# Patient Record
Sex: FEMALE | URBAN - METROPOLITAN AREA
[De-identification: names, ages, dates, MRNs, and addresses within clinical notes are randomized per-mention and may not be internally consistent; named-entity substitution may affect disease eponyms.]

---

## 2018-04-03 ENCOUNTER — EMERGENCY (EMERGENCY)
Facility: HOSPITAL | Age: 28
LOS: 0 days | Discharge: HOME | End: 2018-04-03

## 2018-04-03 VITALS
HEART RATE: 93 BPM | DIASTOLIC BLOOD PRESSURE: 82 MMHG | SYSTOLIC BLOOD PRESSURE: 130 MMHG | TEMPERATURE: 98 F | OXYGEN SATURATION: 97 % | RESPIRATION RATE: 20 BRPM

## 2018-04-03 DIAGNOSIS — S61.032A PUNCTURE WOUND WITHOUT FOREIGN BODY OF LEFT THUMB WITHOUT DAMAGE TO NAIL, INITIAL ENCOUNTER: ICD-10-CM
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
## 2018-04-03 NOTE — ED PROVIDER NOTE - PHYSICAL EXAMINATION
Gen: Alert, NAD, well appearing  Skin: +puncture wound to posterior aspect of left thumb bleeding controlled sensation intact

## 2018-04-03 NOTE — ED PROVIDER NOTE - OBJECTIVE STATEMENT
Patient presents to the ED for evaluation after a needle stick. 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  During a trauma alert patient was stuck with clean IV needle as another nurse was attempting to place IV. patient washed hands. puncture wound noted to left posterior aspect of thumb.  patient up to date on tetanus and vaccines.

## 2018-04-03 NOTE — ED PROVIDER NOTE - PROGRESS NOTE DETAILS
rapid HIV test on source patient advised to trauma staff. patient to follow up with employee health discussed.

## 2018-04-03 NOTE — ED PROVIDER NOTE - MEDICAL DECISION MAKING DETAILS
patient d/c to follow up with [employer], clean IV needle risk zero for ID however patient advised to follow up patient has had recent clearance done by [facility] team

## 2018-08-31 ENCOUNTER — OUTPATIENT (OUTPATIENT)
Dept: OUTPATIENT SERVICES | Facility: HOSPITAL | Age: 28
LOS: 1 days | Discharge: HOME | End: 2018-08-31

## 2018-08-31 DIAGNOSIS — E28.2 POLYCYSTIC OVARIAN SYNDROME: ICD-10-CM

## 2018-08-31 DIAGNOSIS — N92.6 IRREGULAR MENSTRUATION, UNSPECIFIED: ICD-10-CM

## 2018-12-30 ENCOUNTER — EMERGENCY (EMERGENCY)
Facility: HOSPITAL | Age: 28
LOS: 0 days | Discharge: HOME | End: 2018-12-30
Admitting: PHYSICIAN ASSISTANT

## 2018-12-30 VITALS
OXYGEN SATURATION: 98 % | HEART RATE: 78 BPM | SYSTOLIC BLOOD PRESSURE: 118 MMHG | RESPIRATION RATE: 16 BRPM | DIASTOLIC BLOOD PRESSURE: 73 MMHG | TEMPERATURE: 98 F

## 2018-12-30 DIAGNOSIS — Z77.21 CONTACT WITH AND (SUSPECTED) EXPOSURE TO POTENTIALLY HAZARDOUS BODY FLUIDS: ICD-10-CM

## 2018-12-30 RX ORDER — EMTRICITABINE AND TENOFOVIR DISOPROXIL FUMARATE 200; 300 MG/1; MG/1
1 TABLET, FILM COATED ORAL ONCE
Qty: 0 | Refills: 0 | Status: COMPLETED | OUTPATIENT
Start: 2018-12-30 | End: 2018-12-30

## 2018-12-30 RX ORDER — RALTEGRAVIR 400 MG/1
400 TABLET, FILM COATED ORAL ONCE
Qty: 0 | Refills: 0 | Status: COMPLETED | OUTPATIENT
Start: 2018-12-30 | End: 2018-12-30

## 2018-12-30 RX ADMIN — RALTEGRAVIR 400 MILLIGRAM(S): 400 TABLET, FILM COATED ORAL at 01:56

## 2018-12-30 RX ADMIN — EMTRICITABINE AND TENOFOVIR DISOPROXIL FUMARATE 1 TABLET(S): 200; 300 TABLET, FILM COATED ORAL at 01:56

## 2018-12-30 NOTE — ED ADULT NURSE NOTE - OBJECTIVE STATEMENT
Pt presents for evaluation of exposure to blood. Pt was exposed to blood while removing an 18G IV from patient arms and felt liquid go into her right eye. Pt is unsure if it was blood. Pt is up to date on all vaccines.

## 2018-12-30 NOTE — ED PROVIDER NOTE - PROGRESS NOTE DETAILS
patient to follow up with employee health minimal risk for exposure but given source unknown patient to be started on PEP. Call to source patient made and waiting call back.

## 2018-12-30 NOTE — ED PROVIDER NOTE - PHYSICAL EXAMINATION
Gen: Alert, NAD, well appearing  Head: NC, AT, PERRL, EOMI, normal lids/conjunctiva, Contact lenses removed  ENT: normal hearing, patent oropharynx without erythema/exudate, uvula midline  Skin: no rash  Neuro: AAOx3

## 2018-12-30 NOTE — ED PROVIDER NOTE - NS ED ROS FT
Review of Systems    Constitutional: (-) fever  Eyes/ENT: (-) blurry vision  Respiratory: (-) cough, (-) shortness of breath  Gastrointestinal: (-) vomiting  Musculoskeletal: (-) neck pain  Neurological: (-) headache

## 2018-12-30 NOTE — ED PROVIDER NOTE - OBJECTIVE STATEMENT
Patient is a 28 years old F presents to the ED for evaluation after exposure to blood.  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 Pt is unsure if she was exposed to blood.  Source patient left prior to being consented for HIV source testing. Pt is up to date on vaccination.

## 2018-12-30 NOTE — ED PROVIDER NOTE - NS ED NOTE AC HIGH RISK COUNTRIES
Mich Campbell MD  ENT, [facility]   [address] [facility] [facility] [address]   Phone: [phone]    OR    Ricky White MD  [facility]   [address] [facility] [address]   Phone: [phone]    =======================    Medicare Wellness Visit  Plan for Preventive Care    A good way for you to stay healthy is to use preventive care.  Medicare covers many services that can help you stay healthy.* The goal of these services is to find any health problems as quickly as possible. Finding problems early can help make them easier to treat.  Your personal plan below lists the services you may need and when they are due.     Health Maintenance Summary     Topic Due On Due Status Completed On Postpone Until Reason    MAMMOGRAM - BREAST CANCER SCREENING Aubrey 10, 2019 Not Due Aubrey 10, 2017      Colorectal Cancer Screening - Colonoscopy Apr 25, 2024 Not Due Apr 25, 2014      Osteoporosis Screening  Completed Aubrey 10, 2017      Immunization-Zoster Jun 13, 2008 Postponed  Jan 27, 2018 Insurance or Financial    Immunization - Pneumococcal  Completed Oct 3, 2014      Diabetes Eye Exam Oct 25, 2017 Due Soon Oct 25, 2016      Glycohemoglobin A1C  (Diabetes Sugar)  Nov 25, 2017 Not Due Aug 25, 2017      GFR  (Kidney Function Test)  Aug 25, 2018 Not Due Aug 25, 2017      Immunization - TDAP Pregnancy  Hidden       Diabetes Foot Exam  Oct 14, 2017 Due Soon Oct 14, 2016      Medicare Wellness Visit Sep 8, 2018 Not Due Sep 8, 2017      IMMUNIZATION - DTaP/Tdap/Td Jun 13, 1967 Postponed  Sep 11, 2017 Insurance or Financial    Immunization-Influenza Sep 1, 2017 Due On Sep 19, 2016      Hepatitis C Screening Jun 13, 1999 Overdue              Preventive Care for Women and Men    Heart Screenings (Cardiovascular):  · Blood tests are used to check your cholesterol, lipid and triglyceride levels. High levels can increase your risk for heart disease and stroke. High levels can be treated with  medications, diet and exercise. Lowering your levels can help keep your heart and blood vessels healthy.  Your provider will order these tests if they are needed.    · An ultrasound is done to see if you have an abdominal aortic aneurysm (AAA).  This is an enlargement of one of the main blood vessels that delivers blood to the body.   In the [place], 9,000 deaths are caused by AAA.  You may not even know you have this problem and as many as 1 in 3 people will have a serious problem if it is not treated.  Early diagnosis allows for more effective treatment and cure.  If you have a family history of AAA or are a male age 65-75 who has smoked, you are at higher risk of an AAA.  Your provider can order this test, if needed.    Colorectal Screening:  · There are many tests that are used to check for cancer of your colon and rectum. You and your provider should discuss what test is best for you and when to have it done.  Options include:  · Screening Colonoscopy: exam of the entire colon, seen through a flexible lighted tube.  · Flexible Sigmoidoscopy: exam of the last third (sigmoid portion) of the colon and rectum, seen through a flexible lighted tube.  · Cologuard DNA stool test: a sample of your stool is used to screen for cancer and unseen blood in your stool.  · Fecal Occult Blood Test: a sample of your stool is studied to find any unseen blood    Flu Shot:  · An immunization that helps to prevent influenza (the flu). You should get this every year. The best time to get the shot is in the fall.    Pneumococcal Shot:  • Vaccines are available that can help prevent pneumococcal disease, which is any type of infection caused by Streptococcus pneumoniae bacteria.   Their use can prevent some cases of pneumonia, meningitis, and sepsis. There are two types of pneumococcal vaccines:   o Conjugate vaccines (PCV-13 or Prevnar 13®) - helps protect against the 13 types of pneumococcal bacteria that are the most common  causes of serious infections in children and adults.    o Polysaccharide vaccine (PPSV23 or Poktsnvwl83®) - helps protect against 23 types of pneumococcal bacteria for patients who are recommended to get it.  These vaccines should be given at least 12 months apart.  A booster is usually not needed.     Hepatitis B Shot:  · An immunization that helps to protect people from getting Hepatitis B. Hepatitis B is a virus that spreads through contact with infected blood or body fluids. Many people with the virus do not have symptoms.  The virus can lead to serious problems, such as liver disease. Some people are at higher risk than others. Your doctor will tell you if you need this shot.     Diabetes Screening:  · A test to measure sugar (glucose) in your blood is called a fasting blood sugar. Fasting means you cannot have food or drink for at least 8 hours before the test. This test can detect diabetes long before you may notice symptoms.    Glaucoma Screening:  · Glaucoma screening is performed by your eye doctor. The test measures the fluid pressure inside your eyes to determine if you have glaucoma.     Hepatitis C Screening:  · A blood test to see if you have the hepatitis C virus.  Hepatitis C attacks the liver and is a major cause of chronic liver disease.  Medicare will cover a single screening for all adults born between 1945 & 1965, or high risk patients (people who have injected illegal drugs or people who have had blood transfusions).  High risk patients who continue to inject illegal drugs can be screened for Hepatitis C every year.    Smoking and Tobacco-Use Cessation Counseling:  · Tobacco is the single greatest cause of disease and early death in our country today. Medication and counseling together can increase a person’s chance of quitting for good.   · Medicare covers two quitting attempts per year, with four counseling sessions per attempt (eight sessions in a 12 month period)    Preventive Screening  tests for Women    Screening Mammograms and Breast Exams:  · An x-ray of your breasts to check for breast cancer before you or your doctor may be able to feel it.  If breast cancer is found early it can usually be treated with success.    Pelvic Exams and Pap Tests:  · An exam to check for cervical and vaginal cancer. A Pap test is a lab test in which cells are taken from your cervix and sent to the lab to look for signs of cervical cancer. If cancer of the cervix is found early, chances for a cure are good. Testing can generally end at age 65, or if a woman has a hysterectomy for a benign condition. Your provider may recommend more frequent testing if certain abnormal results are found.    Bone Mass Measurements:  · A painless x-ray of your bone density to see if you are at risk for a broken bone. Bone density refers to the thickness of bones or how tightly the bone tissue is packed.    Preventive Screening tests for Men    Prostate Screening:  · PSA - Prostate Cancer blood test.  Experts do not recommend routine screening of healthy men with no signs or symptoms of prostate disease.  However, men should not ignore urinary symptoms, and should discuss their family history with their doctor.    *Medicare pays for many preventive services to keep you healthy. For some of these services, you might have to pay a deductible, coinsurance, and / or copayment.  The amounts vary depending on the type of services you need and the kind of Medicare health plan you have.               No

## 2019-02-01 ENCOUNTER — OUTPATIENT (OUTPATIENT)
Dept: OUTPATIENT SERVICES | Facility: HOSPITAL | Age: 29
LOS: 1 days | Discharge: HOME | End: 2019-02-01

## 2019-02-04 DIAGNOSIS — Z00.8 ENCOUNTER FOR OTHER GENERAL EXAMINATION: ICD-10-CM

## 2019-04-25 ENCOUNTER — EMERGENCY (EMERGENCY)
Facility: HOSPITAL | Age: 29
LOS: 0 days | Discharge: HOME | End: 2019-04-25
Admitting: STUDENT IN AN ORGANIZED HEALTH CARE EDUCATION/TRAINING PROGRAM
Payer: OTHER MISCELLANEOUS

## 2019-04-25 VITALS
OXYGEN SATURATION: 98 % | HEART RATE: 89 BPM | DIASTOLIC BLOOD PRESSURE: 71 MMHG | TEMPERATURE: 98 F | SYSTOLIC BLOOD PRESSURE: 121 MMHG | RESPIRATION RATE: 18 BRPM
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
## 2019-04-25 DIAGNOSIS — S69.92XA UNSPECIFIED INJURY OF LEFT WRIST, HAND AND FINGER(S), INITIAL ENCOUNTER: ICD-10-CM

## 2019-04-25 DIAGNOSIS — S61.032A PUNCTURE WOUND WITHOUT FOREIGN BODY OF LEFT THUMB WITHOUT DAMAGE TO NAIL, INITIAL ENCOUNTER: ICD-10-CM
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
## 2019-04-25 PROCEDURE — 99284 EMERGENCY DEPT VISIT MOD MDM: CPT | Mod: 25

## 2019-04-25 PROCEDURE — 99053 MED SERV 10PM-8AM 24 HR FAC: CPT

## 2019-04-25 NOTE — ED ADULT NURSE NOTE - OBJECTIVE STATEMENT
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 No redness swelling or bleeding at this time. No pain at site. Pt was working at time of incident.

## 2019-04-25 NOTE — ED PROVIDER NOTE - PHYSICAL EXAMINATION
CONSTITUTIONAL: Well-appearing; well-nourished; in no apparent distress.   MS: left thumb with FROM. n/v intact  SKIN: small puncture wound to left thumb. no bleeding  NEURO/PSYCH: A & O x 4; grossly unremarkable.

## 2019-04-25 NOTE — ED ADULT NURSE NOTE - NSIMPLEMENTINTERV_GEN_ALL_ED
Implemented All Universal Safety Interventions:  [place] to call system. Call bell, personal items and telephone within reach. Instruct patient to call for assistance. Room bathroom lighting operational. Non-slip footwear when patient is off stretcher. Physically safe environment: no spills, clutter or unnecessary equipment. Stretcher in lowest position, wheels locked, appropriate side rails in place.

## 2019-04-25 NOTE — ED PROVIDER NOTE - OBJECTIVE STATEMENT
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 bleeding improved after compression. denies numbness/weakness to left thumb/hand. 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 bleeding improved after compression. denies numbness/weakness to left thumb/hand. tdap < 5 years

## 2019-04-25 NOTE — ED ADULT NURSE NOTE - EXTENSIONS OF SELF_ADULT
Second flulike illness of the season this time causing pressure in the maxillary and frontal sinuses and a persistent cough. Non-smoker. Breast cancer survivor doing very well. Vital signs are normal no rashes no nodes joints are spared.   No pain on s None

## 2019-05-29 PROBLEM — Z00.00 ENCOUNTER FOR PREVENTIVE HEALTH EXAMINATION: Status: ACTIVE | Noted: 2019-05-29

## 2019-05-30 ENCOUNTER — APPOINTMENT (OUTPATIENT)
Age: 29
End: 2019-05-30
Payer: COMMERCIAL

## 2019-05-30 PROCEDURE — 81000 URINALYSIS NONAUTO W/SCOPE: CPT

## 2019-05-30 PROCEDURE — 99395 PREV VISIT EST AGE 18-39: CPT

## 2019-09-19 ENCOUNTER — OUTPATIENT (OUTPATIENT)
Dept: OUTPATIENT SERVICES | Facility: HOSPITAL | Age: 29
LOS: 1 days | Discharge: HOME | End: 2019-09-19

## 2019-09-19 DIAGNOSIS — R74.8 ABNORMAL LEVELS OF OTHER SERUM ENZYMES: ICD-10-CM

## 2019-12-26 ENCOUNTER — APPOINTMENT (OUTPATIENT)
Dept: OBGYN | Facility: CLINIC | Age: 29
End: 2019-12-26
Payer: COMMERCIAL

## 2019-12-26 DIAGNOSIS — R87.629 UNSPECIFIED ABNORMAL CYTOLOGICAL FINDINGS IN SPECIMENS FROM VAGINA: ICD-10-CM

## 2019-12-26 DIAGNOSIS — Z86.19 PERSONAL HISTORY OF OTHER INFECTIOUS AND PARASITIC DISEASES: ICD-10-CM

## 2019-12-26 DIAGNOSIS — Z87.42 PERSONAL HISTORY OF OTHER DISEASES OF THE FEMALE GENITAL TRACT: ICD-10-CM

## 2019-12-26 DIAGNOSIS — B97.7 INFLAMMATORY DISEASE OF CERVIX UTERI: ICD-10-CM

## 2019-12-26 DIAGNOSIS — E28.2 POLYCYSTIC OVARIAN SYNDROME: ICD-10-CM

## 2019-12-26 DIAGNOSIS — N72 INFLAMMATORY DISEASE OF CERVIX UTERI: ICD-10-CM

## 2019-12-26 DIAGNOSIS — N97.9 FEMALE INFERTILITY, UNSPECIFIED: ICD-10-CM

## 2019-12-26 PROCEDURE — 99214 OFFICE O/P EST MOD 30 MIN: CPT | Mod: 25

## 2019-12-26 PROCEDURE — 76830 TRANSVAGINAL US NON-OB: CPT

## 2019-12-26 NOTE — PROCEDURE
[No Complications] : there were no complications [Cervical Pap Smear] : cervical Pap smear [Liquid Base] : liquid base [Tolerated Well] : the patient tolerated the procedure well [Suspected Polycystic Ovaries] : suspected polycystic ovaries [Present] : uterus present [Transvaginal Ultrasound] : transvaginal ultrasound [No Fibroid(s)] : no fibroid(s) [Anteverted] : anteverted [L: ___ cm] : L: [unfilled] cm [W: ___cm] : W: [unfilled] cm [FreeTextEntry7] : 2.2 x 4.0 cm [FreeTextEntry8] : 2.5 x 4.2 cm [FreeTextEntry4] : Bilateral ovaries contained multiple subcentimeter cysts suggestive of polycystic ovary syndrome

## 2020-01-24 ENCOUNTER — OUTPATIENT (OUTPATIENT)
Dept: OUTPATIENT SERVICES | Facility: HOSPITAL | Age: 30
LOS: 1 days | Discharge: HOME | End: 2020-01-24
Payer: COMMERCIAL

## 2020-01-24 DIAGNOSIS — N88.3 INCOMPETENCE OF CERVIX UTERI: ICD-10-CM

## 2020-01-24 PROCEDURE — 58340 CATHETER FOR HYSTEROGRAPHY: CPT

## 2020-01-24 PROCEDURE — 74740 X-RAY FEMALE GENITAL TRACT: CPT | Mod: 26

## 2020-04-26 ENCOUNTER — MESSAGE (OUTPATIENT)
Age: 30
End: 2020-04-26

## 2020-05-01 ENCOUNTER — APPOINTMENT (OUTPATIENT)
Dept: DISASTER EMERGENCY | Facility: HOSPITAL | Age: 30
End: 2020-05-01

## 2020-05-02 LAB
SARS-COV-2 IGG SERPL IA-ACNC: 0.1 INDEX
SARS-COV-2 IGG SERPL QL IA: NEGATIVE

## 2020-07-07 ENCOUNTER — OUTPATIENT (OUTPATIENT)
Dept: EMERGENCY DEPT | Facility: HOSPITAL | Age: 30
LOS: 1 days | Discharge: HOME | End: 2020-07-07
Payer: COMMERCIAL

## 2020-07-07 VITALS
HEART RATE: 89 BPM | TEMPERATURE: 98 F | SYSTOLIC BLOOD PRESSURE: 114 MMHG | RESPIRATION RATE: 18 BRPM | OXYGEN SATURATION: 100 % | DIASTOLIC BLOOD PRESSURE: 57 MMHG | WEIGHT: 115.08 LBS

## 2020-07-07 VITALS
SYSTOLIC BLOOD PRESSURE: 114 MMHG | RESPIRATION RATE: 15 BRPM | DIASTOLIC BLOOD PRESSURE: 60 MMHG | TEMPERATURE: 98 F | HEART RATE: 89 BPM

## 2020-07-07 DIAGNOSIS — Z3A.16 16 WEEKS GESTATION OF PREGNANCY: ICD-10-CM

## 2020-07-07 DIAGNOSIS — O20.9 HEMORRHAGE IN EARLY PREGNANCY, UNSPECIFIED: ICD-10-CM

## 2020-07-07 PROCEDURE — 99285 EMERGENCY DEPT VISIT HI MDM: CPT

## 2020-07-07 NOTE — OB PROVIDER TRIAGE NOTE - NSHPLABSRESULTS_GEN_ALL_CORE
5/12  hgb electrophoresis wnl  A pos, antibody neg  HBsAg NR  rubella immune  measles immune  RPR NR  varicella immune  HIV NR    No prenatal chart available.

## 2020-07-07 NOTE — ED PROVIDER NOTE - NS ED ROS FT
Review of Systems:  	•	CONSTITUTIONAL: no fever, no diaphoresis, no chills  	•	SKIN: no rash  	•	HEMATOLOGIC: no bleeding, no bruising  	•	RESPIRATORY: no shortness of breath, no cough  	•	CARDIAC: no chest pain, no palpitations  	•	GI: no abd pain, no nausea, no vomiting, no diarrhea  	•	GENITO-URINARY: +vaginal bleeding. no dysuria, no hematuria, no urgency/frequency   	•	MUSCULOSKELETAL: no joint paint, no swelling, no redness  	•	NEUROLOGIC: no headache, no weakness, no numbness   	•	PSYCH: no anxiety, no depression

## 2020-07-07 NOTE — ED PROVIDER NOTE - OBJECTIVE STATEMENT
30 year old female, currently 16 weeks pregnant, who presents with vaginal bleeding. Patient states she noticed blood when she wiped. No fever, chills, abdominal pain, clots, nausea, vomiting, diarrhea. OB/GYN: Dr Mckeon.

## 2020-07-07 NOTE — OB RN TRIAGE NOTE - NS_ARRIVALINFOCOMMENT_OBGYN_ALL_OB_FT
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

## 2020-07-07 NOTE — ED PROVIDER NOTE - CLINICAL SUMMARY MEDICAL DECISION MAKING FREE TEXT BOX
29 yo 16 wk pregnant female presented to ED for vaginal bleeding. bedside US demonstrated IUP with good FHR. Patient transferred to L&D.

## 2020-07-07 NOTE — ED PROVIDER NOTE - PROGRESS NOTE DETAILS
SISSY: Bedside US, . Called ob/gyn, pending callback. SISSY: Bedside US, . Call ob/gyn, transfer to L&D for further evaluation GW: I personally evaluated the patient. I reviewed the Physician Assistant Fellow's note and agree with the findings and plan.

## 2020-07-07 NOTE — OB PROVIDER TRIAGE NOTE - NSOBPROVIDERNOTE_OBGYN_ALL_OB_FT
31yo  @16w4d, Rh pos, with vaginal bleeding now resolved, r/o threatened , not in labor, clinically and hemodynamically stable  -discharge home  -PO hydration encouraged  -bleeding/ precautions discussed  -f/u at regularly scheduled OB visit on     Dr. Mckeon and Dr. Eugene aware. 31yo  @16w4d, Rh pos, with vaginal bleeding now resolved, threatened , not in labor, clinically and hemodynamically stable  -discharge home  -PO hydration encouraged  -bleeding/ precautions discussed  -f/u at regularly scheduled OB visit on     Dr. Mckeon and Dr. Eugene aware. 29yo  @16w4d, Rh pos, with vaginal bleeding now resolved, threatened , not in  labor, clinically and hemodynamically stable  -discharge home  -PO hydration encouraged  -bleeding/ precautions discussed  -f/u at regularly scheduled OB visit on     Dr. Mckeon and Dr. Eugene aware.

## 2020-07-07 NOTE — ED PROVIDER NOTE - PHYSICAL EXAMINATION
CONSTITUTIONAL: Well-appearing; well-nourished; in no apparent distress.   EYES: PERRL; EOM intact.   CARDIOVASCULAR: Normal S1, S2; no murmurs, rubs, or gallops.   RESPIRATORY: Normal chest excursion with respiration; breath sounds clear and equal bilaterally; no wheezes, rhonchi, or rales.  GI: Palpable uterus c/w gestational stage. Normal bowel sounds; non-tender; no palpable organomegaly.   SKIN: Normal for age and race; warm; dry; good turgor; no apparent lesions or exudate.   NEURO/PSYCH: A & O x 4; grossly unremarkable.

## 2020-07-07 NOTE — OB PROVIDER TRIAGE NOTE - NSHPPHYSICALEXAM_GEN_ALL_CORE
Vital Signs Last 24 Hrs  T(C): 36.7 (07 Jul 2020 22:56), Max: 36.8 (07 Jul 2020 21:42)  T(F): 98 (07 Jul 2020 22:56), Max: 98.2 (07 Jul 2020 21:42)  HR: 89 (07 Jul 2020 22:56) (89 - 89)  BP: 114/60 (07 Jul 2020 22:56) (114/57 - 114/60)  RR: 15 (07 Jul 2020 22:56) (15 - 18)  SpO2: 100% (07 Jul 2020 21:42) (100% - 100%)    Gen: NAD, sitting comfortably  Cardio: RRR, no m/r/g  Resp: CTAB, no w/r/r  Abd: Gravid, soft, NT, no palpable ctx, no suprapubic tenderness, no CVA tenderness  SVE: 0/0/-3   Speculum: no bleeding, no discharge, cervix appears closed  Sono: transverse, head maternal right, back down, post placenta, CL 5.45cm Vital Signs Last 24 Hrs  T(C): 36.7 (07 Jul 2020 22:56), Max: 36.8 (07 Jul 2020 21:42)  T(F): 98 (07 Jul 2020 22:56), Max: 98.2 (07 Jul 2020 21:42)  HR: 89 (07 Jul 2020 22:56) (89 - 89)  BP: 114/60 (07 Jul 2020 22:56) (114/57 - 114/60)  RR: 15 (07 Jul 2020 22:56) (15 - 18)  SpO2: 100% (07 Jul 2020 21:42) (100% - 100%)    Gen: NAD, sitting comfortably  Cardio: RRR, no m/r/g  Resp: CTAB, no w/r/r  Abd: Gravid, soft, NT, no palpable ctx, no suprapubic tenderness, no CVA tenderness  SVE: 0/0/-3   Speculum: no blood, no active bleeding, no discharge, cervix appears closed  Sono: transverse, head maternal right, back down, post placenta, CL 5.45cm

## 2020-07-07 NOTE — ED ADULT TRIAGE NOTE - CHIEF COMPLAINT QUOTE
patient is 16 weeks pregnant noticed scant amount of red blood when she wiped. abdominal cramping to right lower quadrant .

## 2020-07-07 NOTE — ED PROVIDER NOTE - ATTENDING CONTRIBUTION TO CARE
31 yo F who is currently 16 weeks pregnant presents to ED for vaginal bleeding. Patient went to the bathroom and when she wiped she noticed some blood on the toilet paper. No dysuria, hematuria, vaginal discharge. No abdominal pain. Patient sees her GYN and has had blood work and US. No SOB, cough ,fever, chills.     Const: Well nourished, well developed, appears stated age  Eyes: PERRL, no conjunctival injection  HENT:  Neck supple without meningismus   CV: RRR, Warm, well-perfused extremities  RESP: CTA B/L, no tachypnea   GI: soft, non-tender, non-distended  MSK: No gross deformities appreciated  Skin: Warm, dry. No rashes  Neuro: Alert, CNs II-XII grossly intact. Sensation and motor function of extremities grossly intact.  Psych: Appropriate mood and affect.    from previous records patient is A positive   Will do US

## 2020-07-07 NOTE — OB PROVIDER TRIAGE NOTE - HISTORY OF PRESENT ILLNESS
29yo  @16w4d with TAWANA  by first trimester sonogram for episode of bright red vaginal spotting noted while using the bathroom about 1 hour ago.  Used the bathroom 2 more times and did not note any further spotting.  Currently not spotting.  Denies vaginal discharge, cramping or contractions.  Denies any complications with this pregnancy.  Otherwise denies HA, CP, SOB, N/V, fevers, chills, diarrhea, constipation, dysuria, hematuria, abdominal pain.  Last PO intake @1600, last BM this AM, last intercourse 3 days ago.

## 2020-07-08 LAB
APPEARANCE UR: CLEAR — SIGNIFICANT CHANGE UP
BILIRUB UR-MCNC: NEGATIVE — SIGNIFICANT CHANGE UP
COLOR SPEC: COLORLESS — SIGNIFICANT CHANGE UP
DIFF PNL FLD: NEGATIVE — SIGNIFICANT CHANGE UP
GLUCOSE UR QL: NEGATIVE — SIGNIFICANT CHANGE UP
KETONES UR-MCNC: ABNORMAL
LEUKOCYTE ESTERASE UR-ACNC: NEGATIVE — SIGNIFICANT CHANGE UP
NITRITE UR-MCNC: NEGATIVE — SIGNIFICANT CHANGE UP
PH UR: 6.5 — SIGNIFICANT CHANGE UP (ref 5–8)
PROT UR-MCNC: NEGATIVE — SIGNIFICANT CHANGE UP
SP GR SPEC: 1 — LOW (ref 1.01–1.02)
UROBILINOGEN FLD QL: SIGNIFICANT CHANGE UP

## 2020-10-06 ENCOUNTER — APPOINTMENT (OUTPATIENT)
Dept: ANTEPARTUM | Facility: CLINIC | Age: 30
End: 2020-10-06

## 2020-10-06 ENCOUNTER — APPOINTMENT (OUTPATIENT)
Dept: MATERNAL FETAL MEDICINE | Facility: CLINIC | Age: 30
End: 2020-10-06
Payer: COMMERCIAL

## 2020-10-06 ENCOUNTER — APPOINTMENT (OUTPATIENT)
Dept: MATERNAL FETAL MEDICINE | Facility: CLINIC | Age: 30
End: 2020-10-06

## 2020-10-06 PROCEDURE — 76816 OB US FOLLOW-UP PER FETUS: CPT

## 2020-10-06 PROCEDURE — ZZZZZ: CPT

## 2020-11-03 ENCOUNTER — APPOINTMENT (OUTPATIENT)
Dept: MATERNAL FETAL MEDICINE | Facility: CLINIC | Age: 30
End: 2020-11-03
Payer: COMMERCIAL

## 2020-11-03 PROCEDURE — 99215 OFFICE O/P EST HI 40 MIN: CPT

## 2020-11-03 PROCEDURE — 76816 OB US FOLLOW-UP PER FETUS: CPT

## 2020-11-09 ENCOUNTER — APPOINTMENT (OUTPATIENT)
Dept: MATERNAL FETAL MEDICINE | Facility: CLINIC | Age: 30
End: 2020-11-09
Payer: COMMERCIAL

## 2020-11-09 ENCOUNTER — TRANSCRIPTION ENCOUNTER (OUTPATIENT)
Age: 30
End: 2020-11-09

## 2020-11-09 PROCEDURE — 76819 FETAL BIOPHYS PROFIL W/O NST: CPT

## 2020-11-09 PROCEDURE — 99072 ADDL SUPL MATRL&STAF TM PHE: CPT

## 2020-11-18 ENCOUNTER — APPOINTMENT (OUTPATIENT)
Dept: MATERNAL FETAL MEDICINE | Facility: CLINIC | Age: 30
End: 2020-11-18
Payer: COMMERCIAL

## 2020-11-18 PROCEDURE — 76805 OB US >/= 14 WKS SNGL FETUS: CPT

## 2020-11-23 ENCOUNTER — APPOINTMENT (OUTPATIENT)
Dept: MATERNAL FETAL MEDICINE | Facility: CLINIC | Age: 30
End: 2020-11-23
Payer: COMMERCIAL

## 2020-11-23 PROCEDURE — 76819 FETAL BIOPHYS PROFIL W/O NST: CPT

## 2020-11-23 PROCEDURE — 99213 OFFICE O/P EST LOW 20 MIN: CPT

## 2020-12-02 ENCOUNTER — APPOINTMENT (OUTPATIENT)
Dept: MATERNAL FETAL MEDICINE | Facility: CLINIC | Age: 30
End: 2020-12-02
Payer: COMMERCIAL

## 2020-12-02 PROCEDURE — 76816 OB US FOLLOW-UP PER FETUS: CPT

## 2020-12-02 PROCEDURE — 99072 ADDL SUPL MATRL&STAF TM PHE: CPT

## 2020-12-02 PROCEDURE — 76819 FETAL BIOPHYS PROFIL W/O NST: CPT

## 2020-12-08 ENCOUNTER — OUTPATIENT (OUTPATIENT)
Dept: OUTPATIENT SERVICES | Facility: HOSPITAL | Age: 30
LOS: 1 days | Discharge: HOME | End: 2020-12-08

## 2020-12-08 DIAGNOSIS — Z11.59 ENCOUNTER FOR SCREENING FOR OTHER VIRAL DISEASES: ICD-10-CM

## 2020-12-10 ENCOUNTER — APPOINTMENT (OUTPATIENT)
Dept: MATERNAL FETAL MEDICINE | Facility: CLINIC | Age: 30
End: 2020-12-10

## 2020-12-10 ENCOUNTER — INPATIENT (INPATIENT)
Facility: HOSPITAL | Age: 30
LOS: 5 days | Discharge: HOME | End: 2020-12-16
Attending: OBSTETRICS & GYNECOLOGY | Admitting: OBSTETRICS & GYNECOLOGY

## 2020-12-10 VITALS
WEIGHT: 139.99 LBS | SYSTOLIC BLOOD PRESSURE: 114 MMHG | DIASTOLIC BLOOD PRESSURE: 69 MMHG | TEMPERATURE: 98 F | RESPIRATION RATE: 18 BRPM | HEART RATE: 76 BPM | HEIGHT: 64 IN

## 2020-12-10 LAB
APPEARANCE UR: CLEAR — SIGNIFICANT CHANGE UP
BACTERIA # UR AUTO: NEGATIVE — SIGNIFICANT CHANGE UP
BASOPHILS # BLD AUTO: 0.03 K/UL — SIGNIFICANT CHANGE UP (ref 0–0.2)
BASOPHILS NFR BLD AUTO: 0.2 % — SIGNIFICANT CHANGE UP (ref 0–1)
BILIRUB UR-MCNC: NEGATIVE — SIGNIFICANT CHANGE UP
BLD GP AB SCN SERPL QL: SIGNIFICANT CHANGE UP
COLOR SPEC: SIGNIFICANT CHANGE UP
DIFF PNL FLD: ABNORMAL
EOSINOPHIL # BLD AUTO: 0.02 K/UL — SIGNIFICANT CHANGE UP (ref 0–0.7)
EOSINOPHIL NFR BLD AUTO: 0.2 % — SIGNIFICANT CHANGE UP (ref 0–8)
EPI CELLS # UR: 1 /HPF — SIGNIFICANT CHANGE UP (ref 0–5)
GLUCOSE BLDC GLUCOMTR-MCNC: 87 MG/DL — SIGNIFICANT CHANGE UP (ref 70–99)
GLUCOSE UR QL: NEGATIVE — SIGNIFICANT CHANGE UP
HCT VFR BLD CALC: 36.4 % — LOW (ref 37–47)
HGB BLD-MCNC: 12.1 G/DL — SIGNIFICANT CHANGE UP (ref 12–16)
HYALINE CASTS # UR AUTO: 2 /LPF — SIGNIFICANT CHANGE UP (ref 0–7)
IMM GRANULOCYTES NFR BLD AUTO: 1.2 % — HIGH (ref 0.1–0.3)
KETONES UR-MCNC: NEGATIVE — SIGNIFICANT CHANGE UP
L&D DRUG SCREEN, URINE: SIGNIFICANT CHANGE UP
LEUKOCYTE ESTERASE UR-ACNC: ABNORMAL
LYMPHOCYTES # BLD AUTO: 1.9 K/UL — SIGNIFICANT CHANGE UP (ref 1.2–3.4)
LYMPHOCYTES # BLD AUTO: 14.7 % — LOW (ref 20.5–51.1)
MCHC RBC-ENTMCNC: 31.5 PG — HIGH (ref 27–31)
MCHC RBC-ENTMCNC: 33.2 G/DL — SIGNIFICANT CHANGE UP (ref 32–37)
MCV RBC AUTO: 94.8 FL — SIGNIFICANT CHANGE UP (ref 81–99)
MONOCYTES # BLD AUTO: 0.55 K/UL — SIGNIFICANT CHANGE UP (ref 0.1–0.6)
MONOCYTES NFR BLD AUTO: 4.3 % — SIGNIFICANT CHANGE UP (ref 1.7–9.3)
NEUTROPHILS # BLD AUTO: 10.27 K/UL — HIGH (ref 1.4–6.5)
NEUTROPHILS NFR BLD AUTO: 79.4 % — HIGH (ref 42.2–75.2)
NITRITE UR-MCNC: NEGATIVE — SIGNIFICANT CHANGE UP
NRBC # BLD: 0 /100 WBCS — SIGNIFICANT CHANGE UP (ref 0–0)
PH UR: 6.5 — SIGNIFICANT CHANGE UP (ref 5–8)
PLATELET # BLD AUTO: 290 K/UL — SIGNIFICANT CHANGE UP (ref 130–400)
PROT UR-MCNC: NEGATIVE — SIGNIFICANT CHANGE UP
RBC # BLD: 3.84 M/UL — LOW (ref 4.2–5.4)
RBC # FLD: 12.6 % — SIGNIFICANT CHANGE UP (ref 11.5–14.5)
RBC CASTS # UR COMP ASSIST: 2 /HPF — SIGNIFICANT CHANGE UP (ref 0–4)
SARS-COV-2 RNA SPEC QL NAA+PROBE: SIGNIFICANT CHANGE UP
SP GR SPEC: 1.01 — SIGNIFICANT CHANGE UP (ref 1.01–1.03)
UROBILINOGEN FLD QL: SIGNIFICANT CHANGE UP
WBC # BLD: 12.92 K/UL — HIGH (ref 4.8–10.8)
WBC # FLD AUTO: 12.92 K/UL — HIGH (ref 4.8–10.8)
WBC UR QL: 4 /HPF — SIGNIFICANT CHANGE UP (ref 0–5)

## 2020-12-10 RX ORDER — SODIUM CHLORIDE 9 MG/ML
1000 INJECTION, SOLUTION INTRAVENOUS
Refills: 0 | Status: DISCONTINUED | OUTPATIENT
Start: 2020-12-10 | End: 2020-12-12

## 2020-12-10 RX ORDER — DINOPROSTONE 10 MG/241MG
10 INSERT VAGINAL ONCE
Refills: 0 | Status: COMPLETED | OUTPATIENT
Start: 2020-12-10 | End: 2020-12-10

## 2020-12-10 RX ORDER — OXYTOCIN 10 UNIT/ML
333.33 VIAL (ML) INJECTION
Qty: 20 | Refills: 0 | Status: DISCONTINUED | OUTPATIENT
Start: 2020-12-10 | End: 2020-12-16

## 2020-12-10 RX ADMIN — SODIUM CHLORIDE 125 MILLILITER(S): 9 INJECTION, SOLUTION INTRAVENOUS at 20:06

## 2020-12-10 RX ADMIN — DINOPROSTONE 10 MILLIGRAM(S): 10 INSERT VAGINAL at 20:05

## 2020-12-10 NOTE — OB PROVIDER H&P - HISTORY OF PRESENT ILLNESS
31yo  @38w6d by 1st trimester sonogram here for IOL for GDMA1 with cervidil. Denies feeling regular contractions (having [place] contractions), leakage of fluid, vaginal bleeding. Reports good fetal movement. GDMA1 with Fasting FS <90s, 2hr postprandial in 100s. Last checked in clinic today and cervical exam was closed and soft. Denies complications during pregnancy. GBS negative.

## 2020-12-10 NOTE — OB PROVIDER H&P - NSHPLABSRESULTS_GEN_ALL_CORE
Sonograms:  EGa 12.4, SIUP, NT 1.5mm  EGa 19.4, EFW 343g, cephalic, 3vc, posterior placenta, cervical lenth 36mm, stephanie normal, normal anatomy  EGA 29.4, EFW 1382g (48%), breech, 3vc, mvp 44mm, BPP 8/8, s/d ratio 2.1  EGA 33.4, EFw 2488g (61%), 3vc, cephalic, posterior placenta, MVP 51 mm, BPP 8/8    Labs:  5/12/20  hemoglobin electrophoresis nl   HIV NR  RPR NR  measles immune  varicella immune      6/9/20  Stepwise 1, neg  NIPT low risk     7/28/20  Stepwise part 2 neg     9/23/20      10/20/20  Hba1c 5.2  GTT 74/148/184/157

## 2020-12-10 NOTE — OB PROVIDER H&P - ATTENDING COMMENTS
IMP: IUP @ 38.6 wks, GDMA1    Plan: Admit for Cervidil Ripening, then Pitocin Induction, Pain management, Anticipated

## 2020-12-10 NOTE — OB PROVIDER H&P - NSHPPHYSICALEXAM_GEN_ALL_CORE
Vital Signs Last 24 Hrs  T(C): 36.9 (10 Dec 2020 19:33), Max: 36.9 (10 Dec 2020 19:33)  T(F): 98.4 (10 Dec 2020 19:33), Max: 98.4 (10 Dec 2020 19:33)  HR: 76 (10 Dec 2020 19:33) (76 - 76)  BP: 114/69 (10 Dec 2020 19:33) (114/69 - 114/69)  RR: 18 (10 Dec 2020 19:33) (18 - 18)    Gen: AOx3, NAD  Abd: soft, gravid, nontender, no palpable contractions  Ext: no swelling    EFM: 135/mod florinda/+accels  Pandora: irregular  SVE: closed/L/-3, vtx by sono, intact

## 2020-12-10 NOTE — OB PROVIDER H&P - ASSESSMENT
29yo  @38w6d, GBS neg, here for scheduled induction of labor for GDMA1 with cervidil  - admit to L&D  - clear liquid diet  - IVF hydration  - continuous ECM/toco  - monitor vitals  - pain management prn  - f/u admission labs and covid swab     aware and  to be made aware

## 2020-12-10 NOTE — OB PROVIDER H&P - NS_CTXBYPALP_OBGYN_ALL_OB
How Severe Are Your Spot(S)?: mild
Have Your Spot(S) Been Treated In The Past?: has not been treated
Hpi Title: Evaluation of Skin Lesions
Additional History: Lesions on hands previously biopsied and c/w GA.  Patient requests ILK.  She has other lesions to point out as well.
Year Removed: 1900
None felt

## 2020-12-11 LAB
GLUCOSE BLDC GLUCOMTR-MCNC: 75 MG/DL — SIGNIFICANT CHANGE UP (ref 70–99)
GLUCOSE BLDC GLUCOMTR-MCNC: 77 MG/DL — SIGNIFICANT CHANGE UP (ref 70–99)
GLUCOSE BLDC GLUCOMTR-MCNC: 77 MG/DL — SIGNIFICANT CHANGE UP (ref 70–99)
GLUCOSE BLDC GLUCOMTR-MCNC: 80 MG/DL — SIGNIFICANT CHANGE UP (ref 70–99)
GLUCOSE BLDC GLUCOMTR-MCNC: 81 MG/DL — SIGNIFICANT CHANGE UP (ref 70–99)
PRENATAL SYPHILIS TEST: SIGNIFICANT CHANGE UP

## 2020-12-11 RX ORDER — NALOXONE HYDROCHLORIDE 4 MG/.1ML
0.1 SPRAY NASAL
Refills: 0 | Status: DISCONTINUED | OUTPATIENT
Start: 2020-12-11 | End: 2020-12-16

## 2020-12-11 RX ORDER — ACETAMINOPHEN 500 MG
650 TABLET ORAL ONCE
Refills: 0 | Status: COMPLETED | OUTPATIENT
Start: 2020-12-11 | End: 2020-12-11

## 2020-12-11 RX ORDER — OXYTOCIN 10 UNIT/ML
2 VIAL (ML) INJECTION
Qty: 30 | Refills: 0 | Status: DISCONTINUED | OUTPATIENT
Start: 2020-12-11 | End: 2020-12-12

## 2020-12-11 RX ORDER — BUPIVACAINE HCL/PF 7.5 MG/ML
200 VIAL (ML) INJECTION
Refills: 0 | Status: DISCONTINUED | OUTPATIENT
Start: 2020-12-11 | End: 2020-12-16

## 2020-12-11 RX ORDER — DEXAMETHASONE 0.5 MG/5ML
4 ELIXIR ORAL EVERY 6 HOURS
Refills: 0 | Status: DISCONTINUED | OUTPATIENT
Start: 2020-12-11 | End: 2020-12-16

## 2020-12-11 RX ORDER — ONDANSETRON 8 MG/1
4 TABLET, FILM COATED ORAL EVERY 6 HOURS
Refills: 0 | Status: DISCONTINUED | OUTPATIENT
Start: 2020-12-11 | End: 2020-12-16

## 2020-12-11 RX ADMIN — Medication 650 MILLIGRAM(S): at 07:24

## 2020-12-11 RX ADMIN — Medication 2 MILLIUNIT(S)/MIN: at 09:08

## 2020-12-11 RX ADMIN — Medication 650 MILLIGRAM(S): at 07:45

## 2020-12-11 NOTE — OB RN DELIVERY SUMMARY - NS_SEPSISRSKCALC_OBGYN_ALL_OB_FT
EOS calculated successfully. EOS Risk Factor: 0.5/1000 live births ([facility] national incidence); GA=39w1d; Temp=98.96; ROM=16.25; GBS='Negative'; Antibiotics='No antibiotics or any antibiotics < 2 hrs prior to birth'

## 2020-12-11 NOTE — PROGRESS NOTE ADULT - SUBJECTIVE AND OBJECTIVE BOX
Pt evaluated at bedside, c/o mild cramping, otherwise comfortable.    T(C): 36.9 (12-10-20 @ 19:33), Max: 36.9 (12-10-20 @ 19:33)  HR: 80 (20 @ 07:21) (66 - 82)  BP: 133/79 (20 @ 07:21) (108/58 - 133/79)  RR: 18 (20 @ 03:46) (18 - 18)    CAPILLARY BLOOD GLUCOSE      POCT Blood Glucose.: 77 mg/dL (11 Dec 2020 07:44)      VE: 1/60/-2  Cervidil removed  Ctx: q 2-4 min  FHR: 130 moderate variablity, cat I                          12.1   12.92 )-----------( 290      ( 10 Dec 2020 19:58 )             36.4     A positive    A/P: 30y.o.  @ 39wks IOl GDM A1, s/p cervidil x 1  - Pt may ambulate and shower x 1 hour  - Will start Pitocin @ 0900  - Continuous efm and toco  - Dr. Eugene/ Dr. Mckeon aware

## 2020-12-11 NOTE — PROGRESS NOTE ADULT - ASSESSMENT
29 y/o  at 39w0d, GBS negative, GDMA1 with FS well controlled, for IOL with cervidil  -pain management prn  -cont efm/toco  -f/u pending labs- RPR  -cont to monitor vitals  -cont iv hydration    Dr. Brooks and Dr. Mckeon aware

## 2020-12-11 NOTE — PROGRESS NOTE ADULT - SUBJECTIVE AND OBJECTIVE BOX
PGY1 Note    Patient seen at bedside for evaluation of labor progression, patient reports mild cramping, does not want any pain management at this time    T(F): 98.4 (12-10-20 @ 19:33), Max: 98.4 (12-10-20 @ 19:33)  HR: 66 (20 @ 03:46) (66 - 82)  BP: 108/58 (20 @ 03:46) (108/58 - 120/75)  RR: 18 (20 @ 03:46) (18 - 18)    EFM: 120/mod variability/accels +, cat I  TOCO: q7mins  SVE: 0/0/-3 @    Medications:  dinoprostone Insert: 10 milliGRAM(s) (12-10-20 @ 20:05)  lactated ringers.: 125 mL/Hr (12-10-20 @ 19:30)      Labs:                        12.1   12.92 )-----------( 290      ( 10 Dec 2020 19:58 )             36.4           ABO RH Interpretation: A POS (12-10-20 @ 19:58)    Antibody Screen: NEG (12-10-20 @ 19:58)    Urinalysis Basic - ( 10 Dec 2020 20:38 )    Color: Light Yellow / Appearance: Clear / S.008 / pH: x  Gluc: x / Ketone: Negative  / Bili: Negative / Urobili: <2 mg/dL   Blood: x / Protein: Negative / Nitrite: Negative   Leuk Esterase: Small / RBC: 2 /HPF / WBC 4 /HPF   Sq Epi: x / Non Sq Epi: 1 /HPF / Bacteria: Negative      L&amp;D Drug Screen, Urine: Done (12-10-20 @ 19:58)

## 2020-12-11 NOTE — PROCEDURE NOTE - NSLABRESULTS_OBGYN_ALL_OB_FT
12.1   12.92 )-----------( 290      ( 10 Dec 2020 19:58 )             36.4       POCT Blood Glucose.: 77 mg/dL (11 Dec 2020 07:44)

## 2020-12-11 NOTE — PROGRESS NOTE ADULT - SUBJECTIVE AND OBJECTIVE BOX
PGY1 Note    Patient seen at bedside. No complaints at the moment.    T(F): 98.6 ( @ 12:54), Max: 98.6 ( @ 12:54)  HR: 77 ( @ 15:55)  BP: 101/59 ( @ 15:49) (89/52 - 133/79)  RR: 18 ( @ 12:54)  EFM: 130bpm/moderate variability/+accelerations/no decelerations  TOCO: q1-2mins  SVE: /60/-2 vtx intact per Dr. Mckeon    Medications:    acetaminophen   Tablet ..: 650 ( @ 07:24)  dinoprostone Insert: 10 (12-10 @ 20:05)  lactated ringers.: 125 (12-10 @ 19:30)  oxytocin Infusion: 2 ( @ 09:06): now at 14mu's      Labs:                        12.1   12.92 )-----------( 290      ( 10 Dec 2020 19:58 )             36.4           Antibody Screen: NEG (12-10-20 @ 19:58)    Urinalysis Basic - ( 10 Dec 2020 20:38 )    Color: Light Yellow / Appearance: Clear / S.008 / pH: x  Gluc: x / Ketone: Negative  / Bili: Negative / Urobili: <2 mg/dL   Blood: x / Protein: Negative / Nitrite: Negative   Leuk Esterase: Small / RBC: 2 /HPF / WBC 4 /HPF   Sq Epi: x / Non Sq Epi: 1 /HPF / Bacteria: Negative             PGY1 Note    Patient seen at bedside. No complaints at the moment.    T(F): 98.6 ( @ 12:54), Max: 98.6 ( @ 12:54)  HR: 77 ( @ 15:55)  BP: 101/59 ( @ 15:49) (89/52 - 133/79)  RR: 18 ( @ 12:54)  EFM: 130bpm/moderate variability/+accelerations/no decelerations  TOCO: q1-2mins  SVE: /60/-2 vtx intact per Dr. Mckeon    Medications:    acetaminophen   Tablet ..: 650 ( @ 07:24)  dinoprostone Insert: 10 (12-10 @ 20:05)  lactated ringers.: 125 (12-10 @ 19:30)  oxytocin Infusion: 2 ( @ 09:06): now at 14mu's  epidural @1107     Labs:                        12.1   12.92 )-----------( 290      ( 10 Dec 2020 19:58 )             36.4           Antibody Screen: NEG (12-10-20 @ 19:58)    Urinalysis Basic - ( 10 Dec 2020 20:38 )    Color: Light Yellow / Appearance: Clear / S.008 / pH: x  Gluc: x / Ketone: Negative  / Bili: Negative / Urobili: <2 mg/dL   Blood: x / Protein: Negative / Nitrite: Negative   Leuk Esterase: Small / RBC: 2 /HPF / WBC 4 /HPF   Sq Epi: x / Non Sq Epi: 1 /HPF / Bacteria: Negative      UDS neg

## 2020-12-11 NOTE — PROCEDURE NOTE - ADDITIONAL PROCEDURE DETAILS
VSS post epidural placement  Analgesia at T10 R=L  FH 140s VSS post epidural placement  Analgesia at T10 R=L  FH 140s    1615  Comfortable with FAZAL  VS and FH stable VSS post epidural placement  Analgesia at T10 R=L  FH 140s    1615  Comfortable with FAZAL  VS and FH stable    2055 - patient states that she is having pain in the lower abdomen, recently checked, at 3 cm dilated. Epidural top off administered - 7ml 0f 0.5% bupivacaine, VS and FHR stable throughout, Analgesia at T10 R=L VSS post epidural placement  Analgesia at T10 R=L  FH 140s    1615  Comfortable with FAZAL  VS and FH stable    2055 - patient states that she is having pain in the lower abdomen, recently checked, at 3 cm dilated. Epidural top off administered - 7ml 0f 0.5% bupivacaine after negative aspiration of epidural catheter, Vital signs and Fetal heart rate stable throughout, Analgesia at T10 R=L VSS post epidural placement  Analgesia at T10 R=L  FH 140s    1615  Comfortable with FAZAL  VS and FH stable    2055 - patient states that she is having pain in the lower abdomen, recently checked, at 3 cm dilated. Epidural top off administered - 7ml 0f 0.5% bupivacaine after negative aspiration of epidural catheter, Vital signs and Fetal heart rate stable throughout, Analgesia at T10 R=L    12/12/20 at 0140-  OB team called for top off- patient evaluated, after negative aspiration of epidural catheter, 7ml 2% chloroprocaine administered, vital signs and fetal heart rate stable, analgesia at T10 R=L VSS post epidural placement  Analgesia at T10 R=L  FH 140s    1615  Comfortable with FAZAL  VS and FH stable    2055 - patient states that she is having pain in the lower abdomen, recently checked, at 3 cm dilated. Epidural top off administered - 7ml 0f 0.5% bupivacaine after negative aspiration of epidural catheter, Vital signs and Fetal heart rate stable throughout, Analgesia at T10 R=L    12/12/20 at 0140-  OB team called for top off- patient evaluated, after negative aspiration of epidural catheter, 7ml 2% chloroprocaine administered, vital signs and fetal heart rate stable, analgesia at T10 R=L    0314 - Patient states that she is having pain in the low pelvic region, after negative aspiration of epidural catheter, 5ml 0.5% bupivacaine administered, vital signs and fetal heart rate stable, analgesia at T10 R=L    0445 - Patient states that she is having severe pain. Patient evaluated, epidural catheter checked and still in place, epidural catheter re-taped. Patient positioned on her back, with back up, Epidural catheter aspirated- negative, and 10ml 0.5% bupivacaine administered via the epidural. Patient observed for 15 minutes, patient states that she is much more comfortable. VSS post epidural placement  Analgesia at T10 R=L  FH 140s    1615  Comfortable with FAZAL  VS and FH stable    2055 - patient states that she is having pain in the lower abdomen, recently checked, at 3 cm dilated. Epidural top off administered - 7ml 0f 0.5% bupivacaine after negative aspiration of epidural catheter, Vital signs and Fetal heart rate stable throughout, Analgesia at T10 R=L    12/12/20 at 0140-  OB team called for top off- patient evaluated, after negative aspiration of epidural catheter, 7ml 2% chloroprocaine administered, vital signs and fetal heart rate stable, analgesia at T10 R=L    0314 - Patient states that she is having pain in the low pelvic region, after negative aspiration of epidural catheter, 5ml 0.5% bupivacaine administered, vital signs and fetal heart rate stable, analgesia at T10 R=L    0445 - Patient states that she is having severe pain. Patient evaluated, epidural catheter checked and still in place, epidural catheter re-taped. Patient positioned on her back, with back up, Epidural catheter aspirated- negative, and 10ml 0.5% bupivacaine administered via the epidural. Patient observed for 15 minutes, patient states that she is much more comfortable.  Pt. to O.R. for emergency Cesarian delivery for dystocia and fetal distress.  Epidural catheter removed intact post-Cesarian delivery.  Return to sensorimotor baseline.  No apparent anesthesia-related complications.

## 2020-12-11 NOTE — PROCEDURE NOTE - NSLOADDOSE_OBGYN_ALL_OB
10 ML of 0.125 percent Bupivicaine/15cc/he; Fentanyl 250mcg added to infusion/Continuous Bupivicaine 0.1 percent

## 2020-12-11 NOTE — PROGRESS NOTE ADULT - SUBJECTIVE AND OBJECTIVE BOX
PGY1 Note    Patient seen at bedside for evaluation of labor progression, doing well, no complaints.     T(F): 98.78 (20 @ 16:39), Max: 98.78 (20 @ 16:39)  HR: 78 (20 @ 18:55) (63 - 94)  BP: 111/65 (20 @ 18:47) (89/49 - 133/79)  RR: 18 (20 @ 16:39) (18 - 18)  SpO2: 97% (20 @ 18:50) (92% - 99%)    EFM: 130/mod variability/accels +, cat I  TOCO: q2-4mins  SVE: 3/80/-2, AROM clear by Dr. Mckeon    Medications:  acetaminophen   Tablet ..: 650 milliGRAM(s) (20 @ 07:24)  dinoprostone Insert: 10 milliGRAM(s) (12-10-20 @ 20:05)  lactated ringers.: 125 mL/Hr (12-10-20 @ 19:30)  oxytocin Infusion: 2 mL/Hr (20 @ 09:06)  epidural started at 1107    Labs:                        12.1   12.92 )-----------( 290      ( 10 Dec 2020 19:58 )             36.4           ABO RH Interpretation: A POS (12-10-20 @ 19:58)    Antibody Screen: NEG (12-10-20 @ 19:58)    Urinalysis Basic - ( 10 Dec 2020 20:38 )    Color: Light Yellow / Appearance: Clear / S.008 / pH: x  Gluc: x / Ketone: Negative  / Bili: Negative / Urobili: <2 mg/dL   Blood: x / Protein: Negative / Nitrite: Negative   Leuk Esterase: Small / RBC: 2 /HPF / WBC 4 /HPF   Sq Epi: x / Non Sq Epi: 1 /HPF / Bacteria: Negative      L&amp;D Drug Screen, Urine: Done (12-10-20 @ 19:58)    Prenatal Syphilis Test: Nonreact (20 @ 17:46)

## 2020-12-11 NOTE — PROGRESS NOTE ADULT - ASSESSMENT
A/P:   31 y/o  at 39w0d, GBS negative, GDMA1 with FS well controlled, for IOL s/p cervidil  -continue pitocin- currently at 16mu/min  -pain management prn  -cont efm/toco  -f/u fingersticks  -cont to monitor vitals  -cont iv hydration    Dr. Brooks and Dr. Mckeon

## 2020-12-11 NOTE — PROGRESS NOTE ADULT - ASSESSMENT
29 y/o  at 39w0d, GBS negative, GDMA1 with FS well controlled, for IOL s/p cervidil on pitocin, doing well  -pain management prn  -continue pitocin 2x2 now at 14mu's  -cont efm/toco  -f/u pending labs- RPR  -cont to monitor vitals  -cont iv hydration    Dr. Eugene and Dr. Mckeon To be made aware

## 2020-12-11 NOTE — OB RN DELIVERY SUMMARY - NS_RESUSCITPROC_OBGYN_ALL_OB
T-Piece Resuscitator/Pulse Oximetry/Tactile Stimulation/Nasal Suction Nasal Suction/T-Piece Resuscitator/FIO2/Tactile Stimulation/CPAP/Pulse Oximetry Tactile Stimulation/Nasal Suction/CPAP/T-Piece Resuscitator/Pulse Oximetry

## 2020-12-12 LAB
GLUCOSE BLDC GLUCOMTR-MCNC: 70 MG/DL — SIGNIFICANT CHANGE UP (ref 70–99)
GLUCOSE BLDC GLUCOMTR-MCNC: 78 MG/DL — SIGNIFICANT CHANGE UP (ref 70–99)
GLUCOSE BLDC GLUCOMTR-MCNC: 82 MG/DL — SIGNIFICANT CHANGE UP (ref 70–99)
GLUCOSE BLDC GLUCOMTR-MCNC: 83 MG/DL — SIGNIFICANT CHANGE UP (ref 70–99)

## 2020-12-12 RX ORDER — AZITHROMYCIN 500 MG/1
500 TABLET, FILM COATED ORAL ONCE
Refills: 0 | Status: COMPLETED | OUTPATIENT
Start: 2020-12-12 | End: 2020-12-12

## 2020-12-12 RX ORDER — IBUPROFEN 200 MG
600 TABLET ORAL EVERY 6 HOURS
Refills: 0 | Status: COMPLETED | OUTPATIENT
Start: 2020-12-12 | End: 2021-11-10

## 2020-12-12 RX ORDER — MAGNESIUM HYDROXIDE 400 MG/1
30 TABLET, CHEWABLE ORAL
Refills: 0 | Status: DISCONTINUED | OUTPATIENT
Start: 2020-12-12 | End: 2020-12-16

## 2020-12-12 RX ORDER — ACETAMINOPHEN 500 MG
1000 TABLET ORAL ONCE
Refills: 0 | Status: COMPLETED | OUTPATIENT
Start: 2020-12-12 | End: 2020-12-12

## 2020-12-12 RX ORDER — SODIUM CHLORIDE 9 MG/ML
1000 INJECTION, SOLUTION INTRAVENOUS
Refills: 0 | Status: DISCONTINUED | OUTPATIENT
Start: 2020-12-12 | End: 2020-12-13

## 2020-12-12 RX ORDER — OXYTOCIN 10 UNIT/ML
333.33 VIAL (ML) INJECTION
Qty: 20 | Refills: 0 | Status: DISCONTINUED | OUTPATIENT
Start: 2020-12-12 | End: 2020-12-16

## 2020-12-12 RX ORDER — ACETAMINOPHEN 500 MG
975 TABLET ORAL EVERY 6 HOURS
Refills: 0 | Status: DISCONTINUED | OUTPATIENT
Start: 2020-12-12 | End: 2020-12-16

## 2020-12-12 RX ORDER — DIPHENHYDRAMINE HCL 50 MG
25 CAPSULE ORAL EVERY 6 HOURS
Refills: 0 | Status: DISCONTINUED | OUTPATIENT
Start: 2020-12-12 | End: 2020-12-16

## 2020-12-12 RX ORDER — CEFAZOLIN SODIUM 1 G
2000 VIAL (EA) INJECTION EVERY 8 HOURS
Refills: 0 | Status: COMPLETED | OUTPATIENT
Start: 2020-12-12 | End: 2020-12-13

## 2020-12-12 RX ORDER — ENOXAPARIN SODIUM 100 MG/ML
40 INJECTION SUBCUTANEOUS EVERY 24 HOURS
Refills: 0 | Status: DISCONTINUED | OUTPATIENT
Start: 2020-12-13 | End: 2020-12-16

## 2020-12-12 RX ORDER — OXYCODONE HYDROCHLORIDE 5 MG/1
5 TABLET ORAL ONCE
Refills: 0 | Status: DISCONTINUED | OUTPATIENT
Start: 2020-12-12 | End: 2020-12-16

## 2020-12-12 RX ORDER — CEFAZOLIN SODIUM 1 G
2000 VIAL (EA) INJECTION ONCE
Refills: 0 | Status: COMPLETED | OUTPATIENT
Start: 2020-12-12 | End: 2020-12-12

## 2020-12-12 RX ORDER — OXYCODONE HYDROCHLORIDE 5 MG/1
5 TABLET ORAL
Refills: 0 | Status: DISCONTINUED | OUTPATIENT
Start: 2020-12-12 | End: 2020-12-16

## 2020-12-12 RX ORDER — SIMETHICONE 80 MG/1
80 TABLET, CHEWABLE ORAL EVERY 4 HOURS
Refills: 0 | Status: DISCONTINUED | OUTPATIENT
Start: 2020-12-12 | End: 2020-12-16

## 2020-12-12 RX ORDER — LANOLIN
1 OINTMENT (GRAM) TOPICAL EVERY 6 HOURS
Refills: 0 | Status: DISCONTINUED | OUTPATIENT
Start: 2020-12-12 | End: 2020-12-16

## 2020-12-12 RX ORDER — ACETAMINOPHEN 500 MG
975 TABLET ORAL
Refills: 0 | Status: DISCONTINUED | OUTPATIENT
Start: 2020-12-12 | End: 2020-12-12

## 2020-12-12 RX ORDER — KETOROLAC TROMETHAMINE 30 MG/ML
30 SYRINGE (ML) INJECTION EVERY 6 HOURS
Refills: 0 | Status: DISCONTINUED | OUTPATIENT
Start: 2020-12-12 | End: 2020-12-13

## 2020-12-12 RX ADMIN — Medication 975 MILLIGRAM(S): at 21:36

## 2020-12-12 RX ADMIN — Medication 30 MILLIGRAM(S): at 18:07

## 2020-12-12 RX ADMIN — Medication 30 MILLIGRAM(S): at 19:00

## 2020-12-12 RX ADMIN — Medication 100 MILLIGRAM(S): at 10:50

## 2020-12-12 RX ADMIN — Medication 100 MILLIGRAM(S): at 18:18

## 2020-12-12 RX ADMIN — Medication 975 MILLIGRAM(S): at 21:06

## 2020-12-12 RX ADMIN — Medication 1000 MILLIGRAM(S): at 15:30

## 2020-12-12 RX ADMIN — SODIUM CHLORIDE 125 MILLILITER(S): 9 INJECTION, SOLUTION INTRAVENOUS at 16:34

## 2020-12-12 RX ADMIN — Medication 400 MILLIGRAM(S): at 14:49

## 2020-12-12 RX ADMIN — ONDANSETRON 4 MILLIGRAM(S): 8 TABLET, FILM COATED ORAL at 01:44

## 2020-12-12 RX ADMIN — AZITHROMYCIN 255 MILLIGRAM(S): 500 TABLET, FILM COATED ORAL at 11:30

## 2020-12-12 RX ADMIN — SIMETHICONE 80 MILLIGRAM(S): 80 TABLET, CHEWABLE ORAL at 21:07

## 2020-12-12 RX ADMIN — SIMETHICONE 80 MILLIGRAM(S): 80 TABLET, CHEWABLE ORAL at 18:06

## 2020-12-12 RX ADMIN — Medication 5 MILLIGRAM(S): at 22:42

## 2020-12-12 NOTE — PROGRESS NOTE ADULT - ASSESSMENT
A/P:   31 y/o  at 39w1d, GBS negative, GDMA1 with FS well controlled, for IOL s/p cervidil  -continue pitocin- currently at 16mu/min  - call for top off  -cont efm/toco  -f/u fingersticks  -cont to monitor vitals  -cont iv hydration    Dr. Brooks and Dr. Mckeon

## 2020-12-12 NOTE — PRE-ANESTHESIA EVALUATION ADULT - NSANTHADDINFOFT_GEN_ALL_CORE
31 y/o woman evaluated for emergency Cesarian delivery.  ASA 2E.  Plan epidural anesthesia / GA backup.  Pt. brought emergently to O.R. for delivery.  Verbal consent given.

## 2020-12-12 NOTE — BRIEF OPERATIVE NOTE - NSICDXBRIEFPROCEDURE_GEN_ALL_CORE_FT
PROCEDURES:   section, low transverse, primary 12-Dec-2020 12:24:39 and repair of episiotomy/partial 3rd degree laceration Caio Shea

## 2020-12-12 NOTE — PROGRESS NOTE ADULT - ASSESSMENT
30y now P1 s/p urgent LTCS for failed vacuum delivery, s/p RML episiotomy and third degree repair, EBL 1000cc, POD#0, recovering well     - encourage ambulation, PO hydration  - f/u AM CBC   - monitor vitals, bleeding   - clears diet until flatus  - encourage incentive spirometry   - pain mgmt prn   - simethicone  - routine postop care   - lovenox     Dr. Mckeon and Dr. Shea aware 30y now P1 s/p urgent LTCS for failed vacuum delivery, s/p RML episiotomy and third degree repair, EBL 1000cc, POD#0, recovering well     - encourage ambulation, PO hydration  - f/u AM CBC   - monitor vitals, bleeding   - clear liquids   - d/c rosenberg after duramorph  - encourage incentive spirometry   - pain mgmt prn   - simethicone  - routine postop care   - lovenox     Dr. Mckeon and Dr. Shea aware

## 2020-12-12 NOTE — BRIEF OPERATIVE NOTE - OPERATION/FINDINGS
live female infant weighing 3310g with APGARs 3/7/9 delivered in vertex presentation from +3 station. Normal appearing uterus, B/L tubes/ovaries

## 2020-12-12 NOTE — PROGRESS NOTE ADULT - SUBJECTIVE AND OBJECTIVE BOX
PGY1 Note:  Patient seen and examined at bedside, recovering well, no acute complaints. Denies fever, chills, chest pain, SOB, nausea, vomiting, LE pain. Pain well-controlled with medication. Minimal bleeding, rosenberg in place, OOB to chair, tolerating PO. Not yet voiding, ambulating, passing flatus. Breastfeeding and bottle feeding    Physical Exam  Vital Signs Last 24 Hrs  T(C): 37.3 (12 Dec 2020 19:22), Max: 37.7 (12 Dec 2020 16:09)  T(F): 99.1 (12 Dec 2020 19:22), Max: 99.8 (12 Dec 2020 16:09)  HR: 101 (12 Dec 2020 19:22) (52 - 113)  BP: 99/61 (12 Dec 2020 19:22) (94/50 - 142/84) isolated elevated   RR: 18 (12 Dec 2020 19:22) (18 - 18)  SpO2: 99% (12 Dec 2020 16:10) (89% - 100%)    Gen: AAOx3, NAD  Heart: Normal S1S2, RRR, no m/r/g  Lungs: CTA b/l, no r/r/w   Fundus: firm, below umbilicus   Wound: dressing in place c/d/i   Abd: Soft, nontender, nondistended, BS+  Lochia: minimal  Ext: No calf tenderness, no swelling  UO: (8584-2703) 200cc    Labs:                        12.1   12.92 )-----------( 290      ( 10 Dec 2020 19:58 )             36.4        acetaminophen   Tablet .. 975 milliGRAM(s) Oral every 6 hours  bisacodyl 5 milliGRAM(s) Oral at bedtime  BUpivacaine 0.1% in 0.9% Sodium Chloride PCEA 200 milliLiter(s) Epidural PCA Continuous  ceFAZolin   IVPB 2000 milliGRAM(s) IV Intermittent every 8 hours  dexAMETHasone  Injectable 4 milliGRAM(s) IV Push every 6 hours PRN  diphenhydrAMINE 25 milliGRAM(s) Oral every 6 hours PRN  ibuprofen  Tablet. 600 milliGRAM(s) Oral every 6 hours  ketorolac   Injectable 30 milliGRAM(s) IV Push every 6 hours  lactated ringers. 1000 milliLiter(s) IV Continuous <Continuous>  lanolin Ointment 1 Application(s) Topical every 6 hours PRN  magnesium hydroxide Suspension 30 milliLiter(s) Oral two times a day PRN  naloxone Injectable 0.1 milliGRAM(s) IV Push every 3 minutes PRN  ondansetron Injectable 4 milliGRAM(s) IV Push every 6 hours PRN  oxyCODONE    IR 5 milliGRAM(s) Oral every 3 hours PRN  oxyCODONE    IR 5 milliGRAM(s) Oral once PRN  oxytocin Infusion 333.333 milliUNIT(s)/Min IV Continuous <Continuous>  oxytocin Infusion 333.333 milliUNIT(s)/Min IV Continuous <Continuous>  simethicone 80 milliGRAM(s) Chew every 4 hours

## 2020-12-12 NOTE — OB PROVIDER DELIVERY SUMMARY - NSPROVIDERDELIVERYNOTE_OBGYN_ALL_OB_FT
Pt became fully dilated and started pushing at +2/+3 direct OP position, Pt pushed for 1 hour with minimal movement of the vertex, decision made to perform vacuum-assisted delivery. RML episiotomy performed. Vacuum placed at above exam, multiple attempts at vacuum extraction attempted -  5-6 times with 2 pop-offs without delivery of vertex. In light of failed vacuum, decision made to perform C/S - Consent Obtained - R/B/A/P dw pt. Pt taken to OR where LTCS performed in usual fashion with assistance from below to elevate vertex from deeply impacted direct OP position, delivery of head in direct OP position, live female infant, APGARS 3/7/9 by peds, 3310 gms, 3-vessel cord + placenta complete, no complications with C/S, RML episiotomy and partial 3rd degree laceration repaired with vicryl and chromic in layers, Cord Gases 7.09/7.23

## 2020-12-12 NOTE — PROGRESS NOTE ADULT - SUBJECTIVE AND OBJECTIVE BOX
PGY1 Note    Patient seen at bedside for evaluation of labor progression, she is complaining of contraction pain 8/10, denies any pressure at this time    T(F): 98.24 (20 @ 00:10), Max: 98.78 (20 @ 16:39)  HR: 72 (20 @ 01:36) (63 - 98)  BP: 110/65 (20 @ 01:25) (89/49 - 133/79)  RR: 18 (20 @ 00:10) (18 - 18)  SpO2: 97% (20 @ 01:36) (89% - 99%)    EFM: 130/mod variability/ accels +  TOCO: q3mins  SVE: 3/80/-2 at 1853    Medications:  acetaminophen   Tablet ..: 650 milliGRAM(s) (20 @ 07:24)  dinoprostone Insert: 10 milliGRAM(s) (12-10-20 @ 20:05)  lactated ringers.: 125 mL/Hr (12-10-20 @ 19:30)  oxytocin Infusion: 2 mL/Hr (20 @ 09:06)      Labs:                        12.1   12.92 )-----------( 290      ( 10 Dec 2020 19:58 )             36.4               Urinalysis Basic - ( 10 Dec 2020 20:38 )    Color: Light Yellow / Appearance: Clear / S.008 / pH: x  Gluc: x / Ketone: Negative  / Bili: Negative / Urobili: <2 mg/dL   Blood: x / Protein: Negative / Nitrite: Negative   Leuk Esterase: Small / RBC: 2 /HPF / WBC 4 /HPF   Sq Epi: x / Non Sq Epi: 1 /HPF / Bacteria: Negative        Prenatal Syphilis Test: Nonreact (20 @ 17:46)

## 2020-12-12 NOTE — PROGRESS NOTE ADULT - SUBJECTIVE AND OBJECTIVE BOX
Went to see pt and  to discuss findings/condition of baby. Baby was diagnosed with diffuse subgalial hemorrhage/swelling and small subdural hematoma. Baby is currently in NICU with stable vital signs on nasal NIMV. Babies H/H have been stable since birth at 11:08am. Neurosurgery was in to see baby and told pt/ neurological exam was normal, and they seem to feel that the blood should be reabsorbed and that likely the baby should not have any long term issues from this finding. Neonatologist in to see pt/ and explained all findings, monitoring, and possible treatments. All questions from parents were answered, father taken to NICU to see the baby, mother will go later tonight as she had a C/S.

## 2020-12-12 NOTE — PROGRESS NOTE ADULT - SUBJECTIVE AND OBJECTIVE BOX
PGY1 Note    Patient seen at bedside for evaluation of labor progression, she's complaining of back pain and contraction pain with pressure.     T(F): 98.24 (20 @ 00:10), Max: 98.78 (20 @ 16:39)  HR: 52 (20 @ 02:57) (52 - 98)  BP: 107/61 (20 @ 02:46) (89/49 - 133/79)  RR: 18 (20 @ 00:10) (18 - 18)  SpO2: 98% (20 @ 03:01) (89% - 99%)    EFM: 120/mod variability/accels +, cat i  TOCO: q2mins  SVE: 7/80/-1    Medications:  acetaminophen   Tablet ..: 650 milliGRAM(s) (20 @ 07:24)  dinoprostone Insert: 10 milliGRAM(s) (12-10-20 @ 20:05)  lactated ringers.: 125 mL/Hr (12-10-20 @ 19:30)  oxytocin Infusion: 2 mL/Hr (20 @ 09:06)  epidural at 1107    Labs:                        12.1   12.92 )-----------( 290      ( 10 Dec 2020 19:58 )             36.4               Urinalysis Basic - ( 10 Dec 2020 20:38 )    Color: Light Yellow / Appearance: Clear / S.008 / pH: x  Gluc: x / Ketone: Negative  / Bili: Negative / Urobili: <2 mg/dL   Blood: x / Protein: Negative / Nitrite: Negative   Leuk Esterase: Small / RBC: 2 /HPF / WBC 4 /HPF   Sq Epi: x / Non Sq Epi: 1 /HPF / Bacteria: Negative        Prenatal Syphilis Test: Nonreact (20 @ 17:46)               PGY1 Note    Patient seen at bedside for evaluation of labor progression, she's complaining of back pain and contraction pain with pressure.     T(F): 98.24 (20 @ 00:10), Max: 98.78 (20 @ 16:39)  HR: 52 (20 @ 02:57) (52 - 98)  BP: 107/61 (20 @ 02:46) (89/49 - 133/79)  RR: 18 (20 @ 00:10) (18 - 18)  SpO2: 98% (20 @ 03:01) (89% - 99%)    EFM: 120/mod variability/accels +, cat 1  TOCO: q2mins  SVE: 7/80/-1    Medications:  acetaminophen   Tablet ..: 650 milliGRAM(s) (20 @ 07:24)  dinoprostone Insert: 10 milliGRAM(s) (12-10-20 @ 20:05)  lactated ringers.: 125 mL/Hr (12-10-20 @ 19:30)  oxytocin Infusion: 2 mL/Hr (20 @ 09:06)  epidural at 1107    Labs:                        12.1   12.92 )-----------( 290      ( 10 Dec 2020 19:58 )             36.4               Urinalysis Basic - ( 10 Dec 2020 20:38 )    Color: Light Yellow / Appearance: Clear / S.008 / pH: x  Gluc: x / Ketone: Negative  / Bili: Negative / Urobili: <2 mg/dL   Blood: x / Protein: Negative / Nitrite: Negative   Leuk Esterase: Small / RBC: 2 /HPF / WBC 4 /HPF   Sq Epi: x / Non Sq Epi: 1 /HPF / Bacteria: Negative        Prenatal Syphilis Test: Nonreact (20 @ 17:46)

## 2020-12-12 NOTE — BRIEF OPERATIVE NOTE - NSICDXBRIEFPREOP_GEN_ALL_CORE_FT
PRE-OP DIAGNOSIS:  Arrested active phase of labor 12-Dec-2020 12:25:50 arrest of descent, failed vacuum delivery; GDMA1 Caio Shea

## 2020-12-12 NOTE — OB PROVIDER DELIVERY SUMMARY - NSANTENATALSTERA_OBGYN_ALL_OB
OVERNIGHT EVENTS: s/p thora, echo    Vital Signs Last 24 Hrs  T(C): 36.8 (22 Feb 2019 05:04), Max: 37 (21 Feb 2019 21:00)  T(F): 98.3 (22 Feb 2019 05:04), Max: 98.6 (21 Feb 2019 21:00)  HR: 67 (22 Feb 2019 05:04) (67 - 82)  BP: 152/72 (22 Feb 2019 05:04) (124/68 - 168/78)  RR: 18 (22 Feb 2019 05:04) (16 - 18)  SpO2: 97% (21 Feb 2019 16:35) (97% - 97%)    PHYSICAL EXAMINATION:    GENERAL: The patient is awake and alert in no apparent distress.     HEENT: Head is normocephalic and atraumatic. Extraocular muscles are intact. Mucous membranes are moist.    NECK: Supple.    LUNGS: dec bs r side    HEART: Regular rate and rhythm without murmur.    ABDOMEN: Soft, nontender, and nondistended.      EXTREMITIES: Without any cyanosis, clubbing, rash, lesions or edema.    NEUROLOGIC: Grossly intact.    SKIN: No ulceration or induration present.      LABS:                        12.1   7.63  )-----------( 287      ( 22 Feb 2019 09:05 )             37.2     02-22    142  |  103  |  16  ----------------------------<  154<H>  4.2   |  22  |  1.0    Ca    9.3      22 Feb 2019 09:05  Phos  3.2     02-20  Mg     2.0     02-20    TPro  6.4  /  Alb  x   /  TBili  x   /  DBili  x   /  AST  x   /  ALT  x   /  AlkPhos  x   02-21    PT/INR - ( 21 Feb 2019 08:18 )   PT: 11.60 sec;   INR: 1.01 ratio         PTT - ( 21 Feb 2019 08:18 )  PTT:31.1 sec      CARDIAC MARKERS ( 20 Feb 2019 16:58 )  x     / <0.01 ng/mL / x     / x     / x            Serum Pro-Brain Natriuretic Peptide: 136 pg/mL (02-20-19 @ 17:01)              MICROBIOLOGY:  Culture Results:   Testing in progress (02-21 @ 12:25)      MEDICATIONS  (STANDING):  amLODIPine   Tablet 5 milliGRAM(s) Oral daily  aspirin  chewable 81 milliGRAM(s) Oral daily  chlorhexidine 4% Liquid 1 Application(s) Topical <User Schedule>  enoxaparin Injectable 40 milliGRAM(s) SubCutaneous daily  losartan 100 milliGRAM(s) Oral daily  simvastatin 10 milliGRAM(s) Oral at bedtime    MEDICATIONS  (PRN):  morphine  - Injectable 2 milliGRAM(s) IV Push every 4 hours PRN Severe Pain (7 - 10)      RADIOLOGY & ADDITIONAL STUDIES: Not applicable as gestational age is greater than or equal to 34 weeks.

## 2020-12-13 LAB
BASOPHILS # BLD AUTO: 0.04 K/UL — SIGNIFICANT CHANGE UP (ref 0–0.2)
BASOPHILS NFR BLD AUTO: 0.2 % — SIGNIFICANT CHANGE UP (ref 0–1)
EOSINOPHIL # BLD AUTO: 0.01 K/UL — SIGNIFICANT CHANGE UP (ref 0–0.7)
EOSINOPHIL NFR BLD AUTO: 0 % — SIGNIFICANT CHANGE UP (ref 0–8)
HCT VFR BLD CALC: 23.5 % — LOW (ref 37–47)
HGB BLD-MCNC: 7.9 G/DL — LOW (ref 12–16)
IMM GRANULOCYTES NFR BLD AUTO: 0.6 % — HIGH (ref 0.1–0.3)
LYMPHOCYTES # BLD AUTO: 1.81 K/UL — SIGNIFICANT CHANGE UP (ref 1.2–3.4)
LYMPHOCYTES # BLD AUTO: 7.2 % — LOW (ref 20.5–51.1)
MCHC RBC-ENTMCNC: 32.4 PG — HIGH (ref 27–31)
MCHC RBC-ENTMCNC: 33.6 G/DL — SIGNIFICANT CHANGE UP (ref 32–37)
MCV RBC AUTO: 96.3 FL — SIGNIFICANT CHANGE UP (ref 81–99)
MONOCYTES # BLD AUTO: 0.86 K/UL — HIGH (ref 0.1–0.6)
MONOCYTES NFR BLD AUTO: 3.4 % — SIGNIFICANT CHANGE UP (ref 1.7–9.3)
NEUTROPHILS # BLD AUTO: 22.13 K/UL — HIGH (ref 1.4–6.5)
NEUTROPHILS NFR BLD AUTO: 88.6 % — HIGH (ref 42.2–75.2)
NRBC # BLD: 0 /100 WBCS — SIGNIFICANT CHANGE UP (ref 0–0)
PLATELET # BLD AUTO: 215 K/UL — SIGNIFICANT CHANGE UP (ref 130–400)
RBC # BLD: 2.44 M/UL — LOW (ref 4.2–5.4)
RBC # FLD: 12.7 % — SIGNIFICANT CHANGE UP (ref 11.5–14.5)
WBC # BLD: 25.01 K/UL — HIGH (ref 4.8–10.8)
WBC # FLD AUTO: 25.01 K/UL — HIGH (ref 4.8–10.8)

## 2020-12-13 RX ORDER — IBUPROFEN 200 MG
600 TABLET ORAL EVERY 6 HOURS
Refills: 0 | Status: DISCONTINUED | OUTPATIENT
Start: 2020-12-13 | End: 2020-12-16

## 2020-12-13 RX ADMIN — SIMETHICONE 80 MILLIGRAM(S): 80 TABLET, CHEWABLE ORAL at 21:23

## 2020-12-13 RX ADMIN — ENOXAPARIN SODIUM 40 MILLIGRAM(S): 100 INJECTION SUBCUTANEOUS at 00:10

## 2020-12-13 RX ADMIN — SIMETHICONE 80 MILLIGRAM(S): 80 TABLET, CHEWABLE ORAL at 11:43

## 2020-12-13 RX ADMIN — Medication 100 MILLIGRAM(S): at 11:43

## 2020-12-13 RX ADMIN — Medication 975 MILLIGRAM(S): at 21:22

## 2020-12-13 RX ADMIN — Medication 975 MILLIGRAM(S): at 15:33

## 2020-12-13 RX ADMIN — Medication 975 MILLIGRAM(S): at 11:42

## 2020-12-13 RX ADMIN — Medication 30 MILLIGRAM(S): at 00:39

## 2020-12-13 RX ADMIN — Medication 975 MILLIGRAM(S): at 03:05

## 2020-12-13 RX ADMIN — Medication 100 MILLIGRAM(S): at 02:35

## 2020-12-13 RX ADMIN — Medication 5 MILLIGRAM(S): at 21:21

## 2020-12-13 RX ADMIN — Medication 600 MILLIGRAM(S): at 17:32

## 2020-12-13 RX ADMIN — Medication 30 MILLIGRAM(S): at 00:09

## 2020-12-13 RX ADMIN — Medication 975 MILLIGRAM(S): at 14:27

## 2020-12-13 RX ADMIN — SIMETHICONE 80 MILLIGRAM(S): 80 TABLET, CHEWABLE ORAL at 06:02

## 2020-12-13 RX ADMIN — SIMETHICONE 80 MILLIGRAM(S): 80 TABLET, CHEWABLE ORAL at 02:35

## 2020-12-13 RX ADMIN — Medication 600 MILLIGRAM(S): at 13:21

## 2020-12-13 RX ADMIN — Medication 600 MILLIGRAM(S): at 17:00

## 2020-12-13 RX ADMIN — SIMETHICONE 80 MILLIGRAM(S): 80 TABLET, CHEWABLE ORAL at 17:00

## 2020-12-13 RX ADMIN — Medication 600 MILLIGRAM(S): at 11:43

## 2020-12-13 RX ADMIN — Medication 975 MILLIGRAM(S): at 02:35

## 2020-12-13 RX ADMIN — Medication 975 MILLIGRAM(S): at 21:52

## 2020-12-13 RX ADMIN — Medication 975 MILLIGRAM(S): at 08:03

## 2020-12-13 NOTE — PROGRESS NOTE ADULT - SUBJECTIVE AND OBJECTIVE BOX
FACUNDO LEWIS  30y  Female    PGY1 Note:    Pt is POD#1. Pt is recovering well, no acute complaints. Pt denies heavy vaginal bleeding, pain well controlled on PO medication. Patient is able to ambulate, she is passing flatus, tolerating a full liquid diet, rosenberg in place with adequate urine. Breast pumping. Denies headache, chest pain, SOB, fever, chills, nausea, vomiting, extermity pain or swelling.       PAST MEDICAL & SURGICAL HISTORY:  No pertinent past medical history    No significant past surgical history        Physical Exam  Vital Signs Last 24 Hrs  T(C): 36.4 (13 Dec 2020 04:16), Max: 37.7 (12 Dec 2020 16:09)  T(F): 97.6 (13 Dec 2020 04:16), Max: 99.8 (12 Dec 2020 16:09)  HR: 86 (13 Dec 2020 04:16) (75 - 113)  BP: 102/57 (13 Dec 2020 04:16) (99/61 - 142/84)  RR: 18 (13 Dec 2020 04:16) (18 - 18)  SpO2: 99% (12 Dec 2020 16:10) (92% - 100%)    Gen: AAOx3, NAD  Heart: RRR, no M/R/G  Lungs: CTAB  Fundus: firm, below umbilicus, nontender   Wound: Pfannenstiel incision, steris in place c/d/i   Abd: Soft, appropriately tender near incision site, mildly distended, BS+  Lochia: minimal  Ext: No calf tenderness, no swelling    Labs:                        7.9    25.01 )-----------( 215      ( 13 Dec 2020 04:53 )             23.5                         12.1   12.92 )-----------( 290      ( 10 Dec 2020 19:58 )             36.4      MEDICATIONS  (STANDING):  acetaminophen   Tablet .. 975 milliGRAM(s) Oral every 6 hours  bisacodyl 5 milliGRAM(s) Oral at bedtime  BUpivacaine 0.1% in 0.9% Sodium Chloride PCEA 200 milliLiter(s) Epidural PCA Continuous  ceFAZolin   IVPB 2000 milliGRAM(s) IV Intermittent every 8 hours  enoxaparin Injectable 40 milliGRAM(s) SubCutaneous every 24 hours  ibuprofen  Tablet. 600 milliGRAM(s) Oral every 6 hours  ketorolac   Injectable 30 milliGRAM(s) IV Push every 6 hours  lactated ringers. 1000 milliLiter(s) (125 mL/Hr) IV Continuous <Continuous>  oxytocin Infusion 333.333 milliUNIT(s)/Min (1000 mL/Hr) IV Continuous <Continuous>  oxytocin Infusion 333.333 milliUNIT(s)/Min (1000 mL/Hr) IV Continuous <Continuous>  simethicone 80 milliGRAM(s) Chew every 4 hours    MEDICATIONS  (PRN):  dexAMETHasone  Injectable 4 milliGRAM(s) IV Push every 6 hours PRN Nausea  diphenhydrAMINE 25 milliGRAM(s) Oral every 6 hours PRN Pruritus  lanolin Ointment 1 Application(s) Topical every 6 hours PRN Sore Nipples  magnesium hydroxide Suspension 30 milliLiter(s) Oral two times a day PRN Constipation  naloxone Injectable 0.1 milliGRAM(s) IV Push every 3 minutes PRN For ANY of the following changes in patient status:  A. RR LESS THAN 10 breaths per minute, B. Oxygen saturation LESS THAN 90%, C. Sedation score of 6  ondansetron Injectable 4 milliGRAM(s) IV Push every 6 hours PRN Nausea  oxyCODONE    IR 5 milliGRAM(s) Oral every 3 hours PRN Moderate to Severe Pain (4-10)  oxyCODONE    IR 5 milliGRAM(s) Oral once PRN Moderate to Severe Pain (4-10)

## 2020-12-13 NOTE — PROGRESS NOTE ADULT - ASSESSMENT
30y now P1 s/p urgent LTCS for failed vacuum delivery, s/p RML episiotomy and third degree repair, POD#1, with asymptomatic drop in h&h, recovering well     - encourage ambulation, PO hydration  - monitor vitals, bleeding   - will advance diet to regular   - Romero removed TOV 1300  - encourage incentive spirometry   - pain mgmt prn   - simethicone  - routine postop care   - davi Mckeon and Dr. Ohara to be made aware

## 2020-12-14 LAB
BASOPHILS # BLD AUTO: 0.03 K/UL — SIGNIFICANT CHANGE UP (ref 0–0.2)
BASOPHILS NFR BLD AUTO: 0.1 % — SIGNIFICANT CHANGE UP (ref 0–1)
EOSINOPHIL # BLD AUTO: 0.19 K/UL — SIGNIFICANT CHANGE UP (ref 0–0.7)
EOSINOPHIL NFR BLD AUTO: 0.9 % — SIGNIFICANT CHANGE UP (ref 0–8)
HCT VFR BLD CALC: 22.9 % — LOW (ref 37–47)
HGB BLD-MCNC: 7.6 G/DL — LOW (ref 12–16)
IMM GRANULOCYTES NFR BLD AUTO: 0.8 % — HIGH (ref 0.1–0.3)
LYMPHOCYTES # BLD AUTO: 1.82 K/UL — SIGNIFICANT CHANGE UP (ref 1.2–3.4)
LYMPHOCYTES # BLD AUTO: 8.7 % — LOW (ref 20.5–51.1)
MCHC RBC-ENTMCNC: 32.1 PG — HIGH (ref 27–31)
MCHC RBC-ENTMCNC: 33.2 G/DL — SIGNIFICANT CHANGE UP (ref 32–37)
MCV RBC AUTO: 96.6 FL — SIGNIFICANT CHANGE UP (ref 81–99)
MONOCYTES # BLD AUTO: 0.62 K/UL — HIGH (ref 0.1–0.6)
MONOCYTES NFR BLD AUTO: 3 % — SIGNIFICANT CHANGE UP (ref 1.7–9.3)
NEUTROPHILS # BLD AUTO: 18.01 K/UL — HIGH (ref 1.4–6.5)
NEUTROPHILS NFR BLD AUTO: 86.5 % — HIGH (ref 42.2–75.2)
NRBC # BLD: 0 /100 WBCS — SIGNIFICANT CHANGE UP (ref 0–0)
PLATELET # BLD AUTO: 244 K/UL — SIGNIFICANT CHANGE UP (ref 130–400)
RBC # BLD: 2.37 M/UL — LOW (ref 4.2–5.4)
RBC # FLD: 12.9 % — SIGNIFICANT CHANGE UP (ref 11.5–14.5)
SARS-COV-2 IGG SERPL QL IA: NEGATIVE — SIGNIFICANT CHANGE UP
SARS-COV-2 IGM SERPL IA-ACNC: 0.15 INDEX — SIGNIFICANT CHANGE UP
WBC # BLD: 20.83 K/UL — HIGH (ref 4.8–10.8)
WBC # FLD AUTO: 20.83 K/UL — HIGH (ref 4.8–10.8)

## 2020-12-14 RX ADMIN — ENOXAPARIN SODIUM 40 MILLIGRAM(S): 100 INJECTION SUBCUTANEOUS at 00:05

## 2020-12-14 RX ADMIN — Medication 600 MILLIGRAM(S): at 22:50

## 2020-12-14 RX ADMIN — Medication 600 MILLIGRAM(S): at 07:15

## 2020-12-14 RX ADMIN — Medication 975 MILLIGRAM(S): at 15:46

## 2020-12-14 RX ADMIN — Medication 600 MILLIGRAM(S): at 06:42

## 2020-12-14 RX ADMIN — SIMETHICONE 80 MILLIGRAM(S): 80 TABLET, CHEWABLE ORAL at 01:59

## 2020-12-14 RX ADMIN — SIMETHICONE 80 MILLIGRAM(S): 80 TABLET, CHEWABLE ORAL at 09:20

## 2020-12-14 RX ADMIN — Medication 5 MILLIGRAM(S): at 22:16

## 2020-12-14 RX ADMIN — Medication 975 MILLIGRAM(S): at 09:50

## 2020-12-14 RX ADMIN — Medication 975 MILLIGRAM(S): at 09:20

## 2020-12-14 RX ADMIN — Medication 1 APPLICATION(S): at 06:42

## 2020-12-14 RX ADMIN — Medication 600 MILLIGRAM(S): at 11:50

## 2020-12-14 RX ADMIN — SIMETHICONE 80 MILLIGRAM(S): 80 TABLET, CHEWABLE ORAL at 18:36

## 2020-12-14 RX ADMIN — Medication 600 MILLIGRAM(S): at 22:16

## 2020-12-14 RX ADMIN — SIMETHICONE 80 MILLIGRAM(S): 80 TABLET, CHEWABLE ORAL at 22:16

## 2020-12-14 RX ADMIN — SIMETHICONE 80 MILLIGRAM(S): 80 TABLET, CHEWABLE ORAL at 15:16

## 2020-12-14 RX ADMIN — Medication 975 MILLIGRAM(S): at 15:16

## 2020-12-14 RX ADMIN — Medication 600 MILLIGRAM(S): at 00:50

## 2020-12-14 RX ADMIN — Medication 600 MILLIGRAM(S): at 00:04

## 2020-12-14 RX ADMIN — Medication 600 MILLIGRAM(S): at 12:20

## 2020-12-14 RX ADMIN — SIMETHICONE 80 MILLIGRAM(S): 80 TABLET, CHEWABLE ORAL at 06:42

## 2020-12-14 NOTE — PROGRESS NOTE ADULT - SUBJECTIVE AND OBJECTIVE BOX
AUSTINIRVING LEWIS  30y  Female    PGY4 Note:    Pt seen and evaluated at bedside. Reports urge urinary incontinence; denies dysuria, fever, chills, nausea/vomiting, diarrhea, LE pain. Denies dizziness/lightheadedness/CP/SOB/palpitations.     Ambulating: Yes  Voiding: Yes  Flatus: Yes  Bowel movements: Yes   Breast or bottle feeding: Breast feeding   Diet: Regular    Physical Exam  Vital Signs Last 24 Hrs  T(C): 36.3 (13 Dec 2020 23:50), Max: 36.9 (13 Dec 2020 08:36)  T(F): 97.3 (13 Dec 2020 23:50), Max: 98.5 (13 Dec 2020 08:36)  HR: 98 (13 Dec 2020 23:50) (76 - 98)  BP: 105/59 (13 Dec 2020 23:50) (92/51 - 115/53)  RR: 20 (13 Dec 2020 23:50) (18 - 20)    Gen: AAOx3, NAD  Heart: RRR, S1S2+  Lungs: CTA B/L, no r/r/w   Fundus: firm, below umbilicus   Wound: steris in place- c/d/i   Abd: Soft, nontender, nondistended, BS+  Ext: No calf tenderness, no swelling    Labs:                        7.9    25.01 )-----------( 215      ( 13 Dec 2020 04:53 )             23.5                         12.1   12.92 )-----------( 290      ( 10 Dec 2020 19:58 )             36.4        MEDICATIONS  (STANDING):  acetaminophen   Tablet .. 975 milliGRAM(s) Oral every 6 hours  bisacodyl 5 milliGRAM(s) Oral at bedtime  BUpivacaine 0.1% in 0.9% Sodium Chloride PCEA 200 milliLiter(s) Epidural PCA Continuous  enoxaparin Injectable 40 milliGRAM(s) SubCutaneous every 24 hours  ibuprofen  Tablet. 600 milliGRAM(s) Oral every 6 hours  oxytocin Infusion 333.333 milliUNIT(s)/Min (1000 mL/Hr) IV Continuous <Continuous>  oxytocin Infusion 333.333 milliUNIT(s)/Min (1000 mL/Hr) IV Continuous <Continuous>  simethicone 80 milliGRAM(s) Chew every 4 hours    MEDICATIONS  (PRN):  dexAMETHasone  Injectable 4 milliGRAM(s) IV Push every 6 hours PRN Nausea  diphenhydrAMINE 25 milliGRAM(s) Oral every 6 hours PRN Pruritus  lanolin Ointment 1 Application(s) Topical every 6 hours PRN Sore Nipples  magnesium hydroxide Suspension 30 milliLiter(s) Oral two times a day PRN Constipation  naloxone Injectable 0.1 milliGRAM(s) IV Push every 3 minutes PRN For ANY of the following changes in patient status:  A. RR LESS THAN 10 breaths per minute, B. Oxygen saturation LESS THAN 90%, C. Sedation score of 6  ondansetron Injectable 4 milliGRAM(s) IV Push every 6 hours PRN Nausea  oxyCODONE    IR 5 milliGRAM(s) Oral every 3 hours PRN Moderate to Severe Pain (4-10)  oxyCODONE    IR 5 milliGRAM(s) Oral once PRN Moderate to Severe Pain (4-10)   FACUNDO HARRISHIEN  30y  Female    PGY4 Note:    Pt seen and evaluated at bedside. Reports urge urinary incontinence; denies dysuria, fever, chills, nausea/vomiting, diarrhea, LE pain. Denies dizziness/lightheadedness/CP/SOB/palpitations. Pain well controlled; denies heavy vaginal bleeding/foul smelling vaginal discharge.     Ambulating: Yes  Voiding: Yes  Flatus: Yes  Bowel movements: Yes   Breast or bottle feeding: Breast feeding   Diet: Regular    Physical Exam  Vital Signs Last 24 Hrs  T(C): 36.3 (13 Dec 2020 23:50), Max: 36.9 (13 Dec 2020 08:36)  T(F): 97.3 (13 Dec 2020 23:50), Max: 98.5 (13 Dec 2020 08:36)  HR: 98 (13 Dec 2020 23:50) (76 - 98)  BP: 105/59 (13 Dec 2020 23:50) (92/51 - 115/53)  RR: 20 (13 Dec 2020 23:50) (18 - 20)    Gen: AAOx3, NAD  Heart: RRR, S1S2+  Lungs: CTA B/L, no r/r/w   Fundus: firm, below umbilicus   Wound: steris in place- c/d/i   Abd: Soft, nontender, nondistended, BS+  Lochia: minimal   Ext: No calf tenderness, no swelling    Labs:                        7.9    25.01 )-----------( 215      ( 13 Dec 2020 04:53 )             23.5                         12.1   12.92 )-----------( 290      ( 10 Dec 2020 19:58 )             36.4        MEDICATIONS  (STANDING):  acetaminophen   Tablet .. 975 milliGRAM(s) Oral every 6 hours  bisacodyl 5 milliGRAM(s) Oral at bedtime  BUpivacaine 0.1% in 0.9% Sodium Chloride PCEA 200 milliLiter(s) Epidural PCA Continuous  enoxaparin Injectable 40 milliGRAM(s) SubCutaneous every 24 hours  ibuprofen  Tablet. 600 milliGRAM(s) Oral every 6 hours  oxytocin Infusion 333.333 milliUNIT(s)/Min (1000 mL/Hr) IV Continuous <Continuous>  oxytocin Infusion 333.333 milliUNIT(s)/Min (1000 mL/Hr) IV Continuous <Continuous>  simethicone 80 milliGRAM(s) Chew every 4 hours    MEDICATIONS  (PRN):  dexAMETHasone  Injectable 4 milliGRAM(s) IV Push every 6 hours PRN Nausea  diphenhydrAMINE 25 milliGRAM(s) Oral every 6 hours PRN Pruritus  lanolin Ointment 1 Application(s) Topical every 6 hours PRN Sore Nipples  magnesium hydroxide Suspension 30 milliLiter(s) Oral two times a day PRN Constipation  naloxone Injectable 0.1 milliGRAM(s) IV Push every 3 minutes PRN For ANY of the following changes in patient status:  A. RR LESS THAN 10 breaths per minute, B. Oxygen saturation LESS THAN 90%, C. Sedation score of 6  ondansetron Injectable 4 milliGRAM(s) IV Push every 6 hours PRN Nausea  oxyCODONE    IR 5 milliGRAM(s) Oral every 3 hours PRN Moderate to Severe Pain (4-10)  oxyCODONE    IR 5 milliGRAM(s) Oral once PRN Moderate to Severe Pain (4-10)

## 2020-12-14 NOTE — PROGRESS NOTE ADULT - ASSESSMENT
A/P: 30y now P1 s/p urgent LTCS for failed vacuum delivery, s/p RML episiotomy and partial third degree repair, EBL 1000cc, GDMA1, POD#2, with asymptomatic drop in h&h, recovering well    - encourage ambulation, PO hydration  - f/u AM CBC   - monitor vitals, bleeding   - regular diet   - f/u UA, Ucx, timed voids   - encourage incentive spirometry   - pain mgmt with PO pain meds   - simethicone/dulcolax   - routine postop care   - lovenox   - anticipate d/c home tomorrow     Will inform Dr. Mckeon

## 2020-12-15 ENCOUNTER — TRANSCRIPTION ENCOUNTER (OUTPATIENT)
Age: 30
End: 2020-12-15

## 2020-12-15 LAB
APPEARANCE UR: CLEAR — SIGNIFICANT CHANGE UP
BACTERIA # UR AUTO: NEGATIVE — SIGNIFICANT CHANGE UP
BASOPHILS # BLD AUTO: 0.03 K/UL — SIGNIFICANT CHANGE UP (ref 0–0.2)
BASOPHILS NFR BLD AUTO: 0.2 % — SIGNIFICANT CHANGE UP (ref 0–1)
BILIRUB UR-MCNC: NEGATIVE — SIGNIFICANT CHANGE UP
BLD GP AB SCN SERPL QL: SIGNIFICANT CHANGE UP
COLOR SPEC: SIGNIFICANT CHANGE UP
DIFF PNL FLD: ABNORMAL
EOSINOPHIL # BLD AUTO: 0.34 K/UL — SIGNIFICANT CHANGE UP (ref 0–0.7)
EOSINOPHIL NFR BLD AUTO: 2.4 % — SIGNIFICANT CHANGE UP (ref 0–8)
EPI CELLS # UR: 1 /HPF — SIGNIFICANT CHANGE UP (ref 0–5)
GLUCOSE UR QL: NEGATIVE — SIGNIFICANT CHANGE UP
HCT VFR BLD CALC: 20.4 % — LOW (ref 37–47)
HGB BLD-MCNC: 6.9 G/DL — CRITICAL LOW (ref 12–16)
HYALINE CASTS # UR AUTO: 1 /LPF — SIGNIFICANT CHANGE UP (ref 0–7)
IMM GRANULOCYTES NFR BLD AUTO: 1.3 % — HIGH (ref 0.1–0.3)
KETONES UR-MCNC: NEGATIVE — SIGNIFICANT CHANGE UP
LEUKOCYTE ESTERASE UR-ACNC: NEGATIVE — SIGNIFICANT CHANGE UP
LYMPHOCYTES # BLD AUTO: 1.76 K/UL — SIGNIFICANT CHANGE UP (ref 1.2–3.4)
LYMPHOCYTES # BLD AUTO: 12.5 % — LOW (ref 20.5–51.1)
MCHC RBC-ENTMCNC: 32.4 PG — HIGH (ref 27–31)
MCHC RBC-ENTMCNC: 33.8 G/DL — SIGNIFICANT CHANGE UP (ref 32–37)
MCV RBC AUTO: 95.8 FL — SIGNIFICANT CHANGE UP (ref 81–99)
MONOCYTES # BLD AUTO: 0.52 K/UL — SIGNIFICANT CHANGE UP (ref 0.1–0.6)
MONOCYTES NFR BLD AUTO: 3.7 % — SIGNIFICANT CHANGE UP (ref 1.7–9.3)
NEUTROPHILS # BLD AUTO: 11.3 K/UL — HIGH (ref 1.4–6.5)
NEUTROPHILS NFR BLD AUTO: 79.9 % — HIGH (ref 42.2–75.2)
NITRITE UR-MCNC: NEGATIVE — SIGNIFICANT CHANGE UP
NRBC # BLD: 0 /100 WBCS — SIGNIFICANT CHANGE UP (ref 0–0)
PH UR: 6.5 — SIGNIFICANT CHANGE UP (ref 5–8)
PLATELET # BLD AUTO: 270 K/UL — SIGNIFICANT CHANGE UP (ref 130–400)
PROT UR-MCNC: NEGATIVE — SIGNIFICANT CHANGE UP
RBC # BLD: 2.13 M/UL — LOW (ref 4.2–5.4)
RBC # FLD: 12.9 % — SIGNIFICANT CHANGE UP (ref 11.5–14.5)
RBC CASTS # UR COMP ASSIST: 13 /HPF — HIGH (ref 0–4)
SP GR SPEC: 1.02 — SIGNIFICANT CHANGE UP (ref 1.01–1.03)
UROBILINOGEN FLD QL: SIGNIFICANT CHANGE UP
WBC # BLD: 14.13 K/UL — HIGH (ref 4.8–10.8)
WBC # FLD AUTO: 14.13 K/UL — HIGH (ref 4.8–10.8)
WBC UR QL: 5 /HPF — SIGNIFICANT CHANGE UP (ref 0–5)

## 2020-12-15 RX ORDER — ACETAMINOPHEN 500 MG
3 TABLET ORAL
Qty: 0 | Refills: 0 | DISCHARGE
Start: 2020-12-15

## 2020-12-15 RX ORDER — IBUPROFEN 200 MG
1 TABLET ORAL
Qty: 0 | Refills: 0 | DISCHARGE
Start: 2020-12-15

## 2020-12-15 RX ORDER — HYDROCORTISONE 1 %
1 OINTMENT (GRAM) TOPICAL
Refills: 0 | Status: DISCONTINUED | OUTPATIENT
Start: 2020-12-15 | End: 2020-12-16

## 2020-12-15 RX ADMIN — SIMETHICONE 80 MILLIGRAM(S): 80 TABLET, CHEWABLE ORAL at 10:05

## 2020-12-15 RX ADMIN — Medication 975 MILLIGRAM(S): at 22:15

## 2020-12-15 RX ADMIN — Medication 600 MILLIGRAM(S): at 06:29

## 2020-12-15 RX ADMIN — Medication 5 MILLIGRAM(S): at 21:41

## 2020-12-15 RX ADMIN — Medication 975 MILLIGRAM(S): at 10:05

## 2020-12-15 RX ADMIN — SIMETHICONE 80 MILLIGRAM(S): 80 TABLET, CHEWABLE ORAL at 06:29

## 2020-12-15 RX ADMIN — SIMETHICONE 80 MILLIGRAM(S): 80 TABLET, CHEWABLE ORAL at 21:41

## 2020-12-15 RX ADMIN — SIMETHICONE 80 MILLIGRAM(S): 80 TABLET, CHEWABLE ORAL at 02:46

## 2020-12-15 RX ADMIN — ENOXAPARIN SODIUM 40 MILLIGRAM(S): 100 INJECTION SUBCUTANEOUS at 00:16

## 2020-12-15 RX ADMIN — Medication 975 MILLIGRAM(S): at 21:40

## 2020-12-15 RX ADMIN — Medication 600 MILLIGRAM(S): at 07:05

## 2020-12-15 NOTE — DISCHARGE NOTE OB - PATIENT PORTAL LINK FT
You can access the FollowMyHealth Patient Portal offered by [facility] by registering at the following website: [email or web address]. By joining [facility]’s FollowMyHealth portal, you will also be able to view your health information using other applications (apps) compatible with our system.

## 2020-12-15 NOTE — DISCHARGE NOTE OB - HOSPITAL COURSE
pt s/p  section for failed vacuum delivery and arrest of descent; postop course complicated by drop in h/h (asymptomatic). pt voiding, ambulating, tolerating regular diet, hence d/nathan home on POD3.  pt s/p  section for failed vacuum delivery and arrest of descent; postop course complicated by drop in h/h (asymptomatic).Received 2UpRBC, h/h improved. pt voiding, ambulating, tolerating regular diet, hence d/nathan home on POD4.

## 2020-12-15 NOTE — DISCHARGE NOTE OB - PLAN OF CARE
healthy mother and baby Nothing in the vagina for 6 weeks (no sex, no tampons, no douching, no swimming pools). Avoid tub baths, you may shower.  Please keep incision clean and dry.   If you have a fever of 100.4F or greater, severe vaginal bleeding, or severe abdominal pain, call your Ob/Gyn or come to the emergency department immediately. normal sugars please do 75g GTT in 6 weeks

## 2020-12-15 NOTE — PROGRESS NOTE ADULT - SUBJECTIVE AND OBJECTIVE BOX
AUSTINIRVING LEWIS  30y  Female    PGY4 Note:    Pt seen and evaluated at bedside. Reports urge urinary incontinence; denies dysuria, fever, chills, nausea/vomiting, diarrhea, LE pain. Denies dizziness/lightheadedness/CP/SOB/palpitations. Pain well controlled; denies heavy vaginal bleeding/foul smelling vaginal discharge.     Ambulating: Yes  Voiding: Yes  Flatus: Yes  Bowel movements: Yes   Breast or bottle feeding: Breast feeding   Diet: Regular    Vital Signs Last 24 Hrs  T(C): 36.8 (14 Dec 2020 23:25), Max: 36.8 (14 Dec 2020 23:25)  T(F): 98.3 (14 Dec 2020 23:25), Max: 98.3 (14 Dec 2020 23:25)  HR: 94 (14 Dec 2020 23:25) (94 - 95)  BP: 113/61 (14 Dec 2020 23:25) (101/55 - 113/61)  RR: 18 (14 Dec 2020 23:25) (18 - 18)    Gen: AAOx3, NAD  Heart: RRR, S1S2+  Lungs: CTA B/L, no r/r/w   Fundus: firm, below umbilicus   Wound: steris in place- c/d/i   Abd: Soft, nontender, nondistended, BS+  Lochia: minimal   Ext: No calf tenderness, no swelling    Labs:   12/15 AM CBC pending                 7.9    25.01 )-----------( 215      ( 13 Dec 2020 04:53 )             23.5     MEDICATIONS  (STANDING):  acetaminophen   Tablet .. 975 milliGRAM(s) Oral every 6 hours  bisacodyl 5 milliGRAM(s) Oral at bedtime  BUpivacaine 0.1% in 0.9% Sodium Chloride PCEA 200 milliLiter(s) Epidural PCA Continuous  enoxaparin Injectable 40 milliGRAM(s) SubCutaneous every 24 hours  ibuprofen  Tablet. 600 milliGRAM(s) Oral every 6 hours  oxytocin Infusion 333.333 milliUNIT(s)/Min (1000 mL/Hr) IV Continuous <Continuous>  oxytocin Infusion 333.333 milliUNIT(s)/Min (1000 mL/Hr) IV Continuous <Continuous>  simethicone 80 milliGRAM(s) Chew every 4 hours    MEDICATIONS  (PRN):  dexAMETHasone  Injectable 4 milliGRAM(s) IV Push every 6 hours PRN Nausea  diphenhydrAMINE 25 milliGRAM(s) Oral every 6 hours PRN Pruritus  lanolin Ointment 1 Application(s) Topical every 6 hours PRN Sore Nipples  magnesium hydroxide Suspension 30 milliLiter(s) Oral two times a day PRN Constipation  naloxone Injectable 0.1 milliGRAM(s) IV Push every 3 minutes PRN For ANY of the following changes in patient status:  A. RR LESS THAN 10 breaths per minute, B. Oxygen saturation LESS THAN 90%, C. Sedation score of 6  ondansetron Injectable 4 milliGRAM(s) IV Push every 6 hours PRN Nausea  oxyCODONE    IR 5 milliGRAM(s) Oral every 3 hours PRN Moderate to Severe Pain (4-10)  oxyCODONE    IR 5 milliGRAM(s) Oral once PRN Moderate to Severe Pain (4-10)

## 2020-12-15 NOTE — DISCHARGE NOTE OB - CARE PROVIDER_API CALL
Conor Mckeon)  Obstetrics and Gynecology  [address] [place] [address]  Phone: [phone]  Fax: [phone]  Established Patient  Follow Up Time: 1 week

## 2020-12-15 NOTE — DISCHARGE NOTE OB - CARE PLAN
Principal Discharge DX:	 delivery delivered  Goal:	healthy mother and baby  Assessment and plan of treatment:	Nothing in the vagina for 6 weeks (no sex, no tampons, no douching, no swimming pools). Avoid tub baths, you may shower.  Please keep incision clean and dry.   If you have a fever of 100.4F or greater, severe vaginal bleeding, or severe abdominal pain, call your Ob/Gyn or come to the emergency department immediately.  Secondary Diagnosis:	Gestational diabetes mellitus  Goal:	normal sugars  Assessment and plan of treatment:	please do 75g GTT in 6 weeks

## 2020-12-15 NOTE — PROGRESS NOTE ADULT - ASSESSMENT
A/P: 30y now P1 s/p urgent LTCS for failed vacuum delivery, s/p RML episiotomy and partial third degree repair, EBL 1000cc, GDMA1, POD#3, with asymptomatic drop in h&h, recovering well    - encourage ambulation, PO hydration  - f/u AM CBC   - monitor vitals, bleeding   - regular diet   - f/u UA, Ucx, timed voids   - encourage incentive spirometry   - pain mgmt with PO pain meds   - simethicone/dulcolax   - routine postop care   - lovenox   - anticipate d/c home today, f/u in 1 week for incision check and 6 weeks for postpartum visit.      Will inform Dr. Mckeon

## 2020-12-16 VITALS
TEMPERATURE: 98 F | HEART RATE: 70 BPM | SYSTOLIC BLOOD PRESSURE: 109 MMHG | RESPIRATION RATE: 18 BRPM | DIASTOLIC BLOOD PRESSURE: 73 MMHG

## 2020-12-16 LAB
BASOPHILS # BLD AUTO: 0.04 K/UL — SIGNIFICANT CHANGE UP (ref 0–0.2)
BASOPHILS NFR BLD AUTO: 0.4 % — SIGNIFICANT CHANGE UP (ref 0–1)
EOSINOPHIL # BLD AUTO: 0.39 K/UL — SIGNIFICANT CHANGE UP (ref 0–0.7)
EOSINOPHIL NFR BLD AUTO: 3.4 % — SIGNIFICANT CHANGE UP (ref 0–8)
HCT VFR BLD CALC: 30.2 % — LOW (ref 37–47)
HGB BLD-MCNC: 9.9 G/DL — LOW (ref 12–16)
IMM GRANULOCYTES NFR BLD AUTO: 2.1 % — HIGH (ref 0.1–0.3)
LYMPHOCYTES # BLD AUTO: 1.98 K/UL — SIGNIFICANT CHANGE UP (ref 1.2–3.4)
LYMPHOCYTES # BLD AUTO: 17.5 % — LOW (ref 20.5–51.1)
MCHC RBC-ENTMCNC: 30.5 PG — SIGNIFICANT CHANGE UP (ref 27–31)
MCHC RBC-ENTMCNC: 32.8 G/DL — SIGNIFICANT CHANGE UP (ref 32–37)
MCV RBC AUTO: 92.9 FL — SIGNIFICANT CHANGE UP (ref 81–99)
MONOCYTES # BLD AUTO: 0.41 K/UL — SIGNIFICANT CHANGE UP (ref 0.1–0.6)
MONOCYTES NFR BLD AUTO: 3.6 % — SIGNIFICANT CHANGE UP (ref 1.7–9.3)
NEUTROPHILS # BLD AUTO: 8.25 K/UL — HIGH (ref 1.4–6.5)
NEUTROPHILS NFR BLD AUTO: 73 % — SIGNIFICANT CHANGE UP (ref 42.2–75.2)
NRBC # BLD: 0 /100 WBCS — SIGNIFICANT CHANGE UP (ref 0–0)
PLATELET # BLD AUTO: 290 K/UL — SIGNIFICANT CHANGE UP (ref 130–400)
RBC # BLD: 3.25 M/UL — LOW (ref 4.2–5.4)
RBC # FLD: 14.3 % — SIGNIFICANT CHANGE UP (ref 11.5–14.5)
WBC # BLD: 11.31 K/UL — HIGH (ref 4.8–10.8)
WBC # FLD AUTO: 11.31 K/UL — HIGH (ref 4.8–10.8)

## 2020-12-16 RX ADMIN — Medication 1 APPLICATION(S): at 00:01

## 2020-12-16 RX ADMIN — Medication 600 MILLIGRAM(S): at 00:40

## 2020-12-16 RX ADMIN — ENOXAPARIN SODIUM 40 MILLIGRAM(S): 100 INJECTION SUBCUTANEOUS at 00:03

## 2020-12-16 RX ADMIN — Medication 600 MILLIGRAM(S): at 00:02

## 2020-12-16 NOTE — PROGRESS NOTE ADULT - SUBJECTIVE AND OBJECTIVE BOX
AUSTINIRVING LEWIS  30y  Female    PGY4 Note:    Pt seen and evaluated at bedside. Reports urge urinary incontinence, improved since yesterday; denies dysuria, fever, chills, nausea/vomiting, diarrhea, LE pain. Denies dizziness/lightheadedness/CP/SOB/palpitations. Pain well controlled; denies heavy vaginal bleeding/foul smelling vaginal discharge.       Ambulating: Yes  Voiding: Yes  Flatus: Yes  Bowel movements: Yes   Breast or bottle feeding: Breast feeding   Diet: Regular    Vital Signs Last 24 Hrs  T(C): 36.3 (16 Dec 2020 00:08), Max: 37.2 (15 Dec 2020 13:15)  T(F): 97.4 (16 Dec 2020 00:08), Max: 99 (15 Dec 2020 13:15)  HR: 72 (16 Dec 2020 00:08) (68 - 94)  BP: 110/68 (16 Dec 2020 00:08) (106/61 - 133/89)  RR: 18 (16 Dec 2020 00:08) (18 - 20)    Gen: AAOx3, NAD  Heart: RRR, S1S2+  Lungs: CTA B/L, no r/r/w   Fundus: firm, below umbilicus   Wound: steris in place- c/d/i   Abd: Soft, nontender, nondistended, BS+  Lochia: minimal   Ext: No calf tenderness, no swelling    Labs: 12/16 AM CBC pending     Urinalysis Basic - ( 15 Dec 2020 06:30 )    Color: Light Yellow / Appearance: Clear / S.020 / pH: x  Gluc: x / Ketone: Negative  / Bili: Negative / Urobili: <2 mg/dL   Blood: x / Protein: Negative / Nitrite: Negative   Leuk Esterase: Negative / RBC: 13 /HPF / WBC 5 /HPF   Sq Epi: x / Non Sq Epi: 1 /HPF / Bacteria: Negative                  7.9    25.01 )-----------( 215      ( 13 Dec 2020 04:53 )             23.5     MEDICATIONS  (STANDING):  acetaminophen   Tablet .. 975 milliGRAM(s) Oral every 6 hours  bisacodyl 5 milliGRAM(s) Oral at bedtime  BUpivacaine 0.1% in 0.9% Sodium Chloride PCEA 200 milliLiter(s) Epidural PCA Continuous  enoxaparin Injectable 40 milliGRAM(s) SubCutaneous every 24 hours  ibuprofen  Tablet. 600 milliGRAM(s) Oral every 6 hours  oxytocin Infusion 333.333 milliUNIT(s)/Min (1000 mL/Hr) IV Continuous <Continuous>  oxytocin Infusion 333.333 milliUNIT(s)/Min (1000 mL/Hr) IV Continuous <Continuous>  simethicone 80 milliGRAM(s) Chew every 4 hours    MEDICATIONS  (PRN):  dexAMETHasone  Injectable 4 milliGRAM(s) IV Push every 6 hours PRN Nausea  diphenhydrAMINE 25 milliGRAM(s) Oral every 6 hours PRN Pruritus  hydrocortisone 1% Cream 1 Application(s) Topical two times a day PRN Rash and/or Itching  lanolin Ointment 1 Application(s) Topical every 6 hours PRN Sore Nipples  magnesium hydroxide Suspension 30 milliLiter(s) Oral two times a day PRN Constipation  naloxone Injectable 0.1 milliGRAM(s) IV Push every 3 minutes PRN For ANY of the following changes in patient status:  A. RR LESS THAN 10 breaths per minute, B. Oxygen saturation LESS THAN 90%, C. Sedation score of 6  ondansetron Injectable 4 milliGRAM(s) IV Push every 6 hours PRN Nausea  oxyCODONE    IR 5 milliGRAM(s) Oral every 3 hours PRN Moderate to Severe Pain (4-10)  oxyCODONE    IR 5 milliGRAM(s) Oral once PRN Moderate to Severe Pain (4-10)

## 2020-12-16 NOTE — PROGRESS NOTE ADULT - ASSESSMENT
A/P: 30y now P1 s/p urgent LTCS for failed vacuum delivery, s/p RML episiotomy and partial third degree repair, EBL 1000cc, GDMA1, POD#4, with asymptomatic drop in h&h s/p 2UpRBC, recovering well    - encourage ambulation, PO hydration  - f/u AM CBC   - monitor vitals, bleeding   - regular diet   - f/u Ucx, timed voids   - encourage incentive spirometry   - pain mgmt with PO pain meds   - simethicone/dulcolax   - routine postop care   - anticipate d/c home today, f/u in 1 week for incision check and 6 weeks for postpartum visit.      Will inform Dr. Mckeon

## 2020-12-16 NOTE — PROGRESS NOTE ADULT - ATTENDING COMMENTS
as above, pt seen at bedside, agree with findings, impression and plan                          9.9    11.31 )-----------( 290      ( 16 Dec 2020 06:32 )             30.2              IMP: s/p LTCS, Anemia - s/p 2 Units PRBC's - POD # 4 - Doing Well    Plan: dc home, NSAID's/PNV's, f/u office - 1 wk - wound check
as above, pt seen at bedside, agree with findings, impression and plan, however pt reports much better urinary control, and while not symptomatic would like 2 Units PRBC's to help with healing recovery    IMP: s/p LTCS, Anemia - POD 3 3 - stable    Plan: Transfuse 2 Units PRBC's, regular diet, Ambulation, pain management, labs s/p transfusion
as above, pt seen at bedside, agree with findings, impression, and plan    Pt c/o urinary incontinence after rosenberg was removed and stood up - likely residual effects of anesthesia    IMP: s/p Failed Vacuum, Primary LTCS, Anemia-Asymptomatic, Leukocytosis - POD # 1 - stable    Plan: rosenberg D/C 'ed, OOB/Ambulation, Regular Diet, Pain Management, Kegels' Exercises, dc IVF and IV Abx, Rpt CBC in AM
IMP: s/p LTCS, Anemia - POD # 2 - stable    Plan: OOB/Ambulation, Pain management, regular diet, frequent bladder emptying/Kegel's, f/u Repeat Labs

## 2020-12-18 DIAGNOSIS — R71.0 PRECIPITOUS DROP IN HEMATOCRIT: ICD-10-CM

## 2020-12-18 DIAGNOSIS — D72.829 ELEVATED WHITE BLOOD CELL COUNT, UNSPECIFIED: ICD-10-CM

## 2020-12-18 DIAGNOSIS — O32.4XX0 MATERNAL CARE FOR HIGH HEAD AT TERM, NOT APPLICABLE OR UNSPECIFIED: ICD-10-CM

## 2020-12-18 DIAGNOSIS — Z3A.38 38 WEEKS GESTATION OF PREGNANCY: ICD-10-CM

## 2020-12-18 DIAGNOSIS — N39.41 URGE INCONTINENCE: ICD-10-CM

## 2021-03-20 NOTE — OB RN INTRAOPERATIVE NOTE - NS_SCRUBTECH_OBGYN_ALL_OB_FT
Patient was seen by [facility] via telehealth. Staff was present in the room at the time. Staff will continue to monitor. cathi

## 2022-02-25 NOTE — OB RN TRIAGE NOTE - NS_TRIAGEMEDICALSCREENINGPERFORMEDBY_OBGYN_ALL_OB_FT
Anesthesia Evaluation     Patient summary reviewed   history of anesthetic complications: PONV               Airway   No difficulty expected  Dental      Pulmonary    (+) asthma,  Cardiovascular     Rhythm: regular        Neuro/Psych  GI/Hepatic/Renal/Endo    (+)  GI bleeding , diabetes mellitus,     Musculoskeletal     Abdominal    Substance History      OB/GYN          Other                        Anesthesia Plan    ASA 3     MAC       Anesthetic plan, all risks, benefits, and alternatives have been provided, discussed and informed consent has been obtained with: patient.        CODE STATUS:        Dr. White

## 2022-08-02 NOTE — ED PROVIDER NOTE - NS ED MD DISPO DISCHARGE CCDA
Patient/Caregiver provided printed discharge information. Localized Dermabrasion With Wire Brush Text: The patient was draped in routine manner.  Localized dermabrasion using 3 x 17 mm wire brush was performed in routine manner to papillary dermis. This spot dermabrasion is being performed to complete skin cancer reconstruction. It also will eliminate the other sun damaged precancerous cells that are known to be part of the regional effect of a lifetime's worth of sun exposure. This localized dermabrasion is therapeutic and should not be considered cosmetic in any regard. Localized Dermabrasion Text: The patient was draped in routine manner.  Localized dermabrasion using 3 x 17 mm wire brush was performed in routine manner to papillary dermis. This spot dermabrasion is being performed to complete skin cancer reconstruction. It also will eliminate the other sun damaged precancerous cells that are known to be part of the regional effect of a lifetime's worth of sun exposure. This localized dermabrasion is therapeutic and should not be considered cosmetic in any regard.

## 2023-01-10 ENCOUNTER — EMERGENCY (EMERGENCY)
Facility: HOSPITAL | Age: 33
LOS: 0 days | Discharge: HOME | End: 2023-01-10
Attending: STUDENT IN AN ORGANIZED HEALTH CARE EDUCATION/TRAINING PROGRAM | Admitting: OBSTETRICS & GYNECOLOGY
Payer: OTHER MISCELLANEOUS

## 2023-01-10 VITALS
TEMPERATURE: 98 F | RESPIRATION RATE: 17 BRPM | OXYGEN SATURATION: 98 % | HEART RATE: 84 BPM | SYSTOLIC BLOOD PRESSURE: 120 MMHG | DIASTOLIC BLOOD PRESSURE: 70 MMHG | WEIGHT: 110.01 LBS

## 2023-01-10 DIAGNOSIS — Z77.21 CONTACT WITH AND (SUSPECTED) EXPOSURE TO POTENTIALLY HAZARDOUS BODY FLUIDS: ICD-10-CM

## 2023-01-10 PROCEDURE — 99284 EMERGENCY DEPT VISIT MOD MDM: CPT

## 2023-01-10 NOTE — ED PROVIDER NOTE - PHYSICAL EXAMINATION
CONSTITUTIONAL: Well-appearing; well-nourished; in no apparent distress.   EYES: PERRL; EOM intact.   SKIN: Normal for age and race; warm; dry; good turgor; no apparent lesions or exudate.   NEURO/PSYCH: A & O x 4; grossly unremarkable.

## 2023-01-10 NOTE — ED PROVIDER NOTE - CLINICAL SUMMARY MEDICAL DECISION MAKING FREE TEXT BOX
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 No contact with bodily fluids. Pt offered prophylaxis medications, denied at this time. [employer] employee packet completed and labs sent to lab. Patient to be discharged from ED. Any available test results were discussed with patient and/or family. Verbal instructions given, including instructions to return to ED immediately for any new, worsening, or concerning symptoms. Patient endorsed understanding. Written discharge instructions additionally given, including follow-up plan.

## 2023-01-10 NOTE — ED PROVIDER NOTE - ATTENDING APP SHARED VISIT CONTRIBUTION OF CARE
32-year-old female no reported past medical history presents for HIV positive blood exposure.  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  No exposure to bodily fluids. No fever, nausea, vomiting, chest pain, cough, numbness/tingling, abdominal pain, diarrhea, constipation, trauma, weakness, or rash.     CONSTITUTIONAL: NAD.   SKIN: warm, dry  HEAD: Normocephalic; atraumatic.  EYES: no conjunctival injection.    ENT: MMM.   NECK: Supple.  CARD: RRR.   RESP: No increased WOB.   ABD: soft ntnd.   EXT: Normal ROM.  No lower extremity edema.   NEURO: Alert, oriented, grossly unremarkable.  PSYCH: Cooperative, appropriate.

## 2023-01-10 NOTE — ED PROVIDER NOTE - NS ED ROS FT
Constitutional: no fever, chills, no recent weight loss, change in appetite or malaise  Eyes: no redness/discharge/pain/vision changes  Neuro: No headache or weakness. No LOC.  Skin: No skin rash.

## 2023-01-10 NOTE — ED PROVIDER NOTE - OBJECTIVE STATEMENT
33 yo female no sig hx present c/o blood exposure. 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 (as per source patient's daughter, patient viral load is undetectable.) patient reported she was taking source patient and felt one drop of blood spilled onto her forehead. no exposure to eyes and mouth. tdap and hep vaccines UTD.

## 2023-01-10 NOTE — ED PROVIDER NOTE - CARE PROVIDER_API CALL
Ruthie Vasquez)  Infectious Disease; Internal Medicine  [address] [place] [address]  Phone: [phone]  Fax: [phone]  Follow Up Time: Routine

## 2023-01-10 NOTE — ED PROVIDER NOTE - PATIENT PORTAL LINK FT
You can access the FollowMyHealth Patient Portal offered by [facility] by registering at the following website: [email or web address]. By joining [facility]’s FollowMyHealth portal, you will also be able to view your health information using other applications (apps) compatible with our system.

## 2023-01-10 NOTE — ED ADULT NURSE NOTE - TEMPLATE LIST FOR HEAD TO TOE ASSESSMENT
Department of Anesthesiology  Postprocedure Note    Patient: Adonay Weeks  MRN: [id number]  YOB: 1956  Date of evaluation: 11/8/2018  Time:  10:12 AM     Procedure Summary     Date:  11/08/18 Room / Location:  [facility] ENDO 06 / [facility] ENDOSCOPY    Anesthesia Start:  0730 Anesthesia Stop:  0900    Procedures:       BRONCHOSCOPY W/EBUS FNA (N/A )      BRONCHOSCOPY BIOPSY BRONCHUS Diagnosis:  (LUNG MASS)    Surgeon:  Tonita Shone, MD Responsible Provider:  Priyank Arredondo MD    Anesthesia Type:  MAC ASA Status:  2          Anesthesia Type: MAC    Irwin Phase I: Irwin Score: 9    Irwin Phase II: Irwin Score: 9    Last vitals: Reviewed and per EMR flowsheets.        Anesthesia Post Evaluation    Patient location during evaluation: PACU  Patient participation: complete - patient participated  Level of consciousness: awake and alert  Airway patency: patent  Nausea & Vomiting: no nausea and no vomiting  Complications: no  Cardiovascular status: hemodynamically stable  Respiratory status: acceptable  Hydration status: stable
General

## 2023-04-11 ENCOUNTER — RESULT REVIEW (OUTPATIENT)
Age: 33
End: 2023-04-11

## 2023-04-11 ENCOUNTER — OUTPATIENT (OUTPATIENT)
Dept: OUTPATIENT SERVICES | Facility: HOSPITAL | Age: 33
LOS: 1 days | End: 2023-04-11

## 2023-04-11 ENCOUNTER — APPOINTMENT (OUTPATIENT)
Dept: MRI IMAGING | Facility: CLINIC | Age: 33
End: 2023-04-11
Payer: COMMERCIAL

## 2023-04-11 PROCEDURE — 74183 MRI ABD W/O CNTR FLWD CNTR: CPT | Mod: 26

## 2023-07-06 NOTE — OB PROVIDER DELIVERY SUMMARY - BABY A: DATE/TIME OF DELIVERY
Patient : Ruth Johnson Age: 47 year old Sex: female   MRN: [id number] Encounter Date: 2023    History     Chief Complaint   Patient presents with   • Abdominal Pain       HPI    Ruth Johnson is a 47 year old presenting to the emergency department with abdominal pain, nausea, fever, and chills that began earlier this morning. Pt states she's experiencing a headache. Pt states she's had similar symptoms in the past but was told she has gastritis. Pt states she feels very bloated but states she had a bowel movement today at 11am. Pt states she has not been out of the house and denies any respiratory symptoms. Pt denies urinary symptoms and vomiting. Pt states she's had a  and her appendix removed in the past. Pt states she had breast cancer 2 years ago but is now in remission.             I have reviewed Ruth Johnson's previous Oncology phone call .  Note Review Summary:  Patient looking to establish care    No Known Allergies    No current facility-administered medications for this encounter.     Current Outpatient Medications   Medication Sig   • ibuprofen (MOTRIN) 600 MG tablet Take 600 mg by mouth every 6 hours as needed for Pain.   • loperamide (IMODIUM) 2 MG capsule Take 2 mg by mouth 4 times daily as needed for Diarrhea.   • oxyCODONE-acetaminophen (ROXICET) 5-325 MG/5ML solution Take by mouth every 4 hours as needed for Pain.       Past Medical History:   Diagnosis Date   • No known problems        Past Surgical History:   Procedure Laterality Date   • Appendectomy     •  section, low transverse         Family History   Problem Relation Age of Onset   • Cancer Maternal Grandmother    • Heart disease Paternal Grandfather    • Diabetes Paternal Grandfather    • Hypertension Mother        Social History     Tobacco Use   • Smoking status: Never   • Smokeless tobacco: Never   Substance Use Topics   • Alcohol use: No   • Drug use: No       Review of Systems      Review of Systems   Constitutional: Positive for chills and fever. Negative for diaphoresis.   HENT: Negative for congestion, nosebleeds, postnasal drip, rhinorrhea, sore throat and voice change.    Eyes: Negative for pain and redness.   Respiratory: Negative for cough, shortness of breath and wheezing.    Cardiovascular: Negative for chest pain.   Gastrointestinal: Positive for abdominal pain and nausea. Negative for blood in stool, diarrhea and vomiting.   Genitourinary: Negative for difficulty urinating, dyspareunia, dysuria and hematuria.   Musculoskeletal: Negative for arthralgias, back pain and neck pain.   Skin: Negative for rash.   Neurological: Positive for headaches. Negative for dizziness, weakness, light-headedness and numbness.   Psychiatric/Behavioral: Negative for agitation, self-injury and suicidal ideas.       Physical Exam     ED Triage Vitals [07/05/23 1957]   ED Triage Vitals Group      Temp 100.2 °F (37.9 °C)      Heart Rate (!) 113      Resp 15      /66      SpO2 95 %      EtCO2 mmHg       Height 5' 1\" (1.549 m)      Weight 163 lb 12.8 oz (74.3 kg)      Weight Scale Used       BMI (Calculated) 30.95      IBW/kg (Calculated) 47.8       Physical Exam  Vitals and nursing note reviewed.   Constitutional:       General: She is not in acute distress.  HENT:      Head: Normocephalic and atraumatic.      Nose: Nose normal. No congestion.      Mouth/Throat:      Mouth: Mucous membranes are moist.   Eyes:      Extraocular Movements: Extraocular movements intact.   Cardiovascular:      Rate and Rhythm: Regular rhythm. Tachycardia present.      Pulses: Normal pulses.   Pulmonary:      Effort: Pulmonary effort is normal. No respiratory distress.      Breath sounds: No wheezing, rhonchi or rales.   Abdominal:      General: There is no distension.      Palpations: Abdomen is soft.      Tenderness: There is abdominal tenderness in the epigastric area and suprapubic area. There is no guarding or  12-Dec-2020 11:08 rebound.   Musculoskeletal:         General: Normal range of motion.      Cervical back: Normal range of motion.      Right lower leg: No edema.      Left lower leg: No edema.   Skin:     General: Skin is warm and dry.      Comments: Warm to the touch   Neurological:      General: No focal deficit present.      Mental Status: She is alert and oriented to person, place, and time.   Psychiatric:         Mood and Affect: Mood normal.         Behavior: Behavior normal.      Comments: Appears malaised           Procedures     Procedures    Lab Results     Results for orders placed or performed during the hospital encounter of 07/05/23   Comprehensive Metabolic Panel   Result Value Ref Range    Fasting Status      Sodium 138 135 - 145 mmol/L    Potassium 3.4 3.4 - 5.1 mmol/L    Chloride 104 97 - 110 mmol/L    Carbon Dioxide 24 21 - 32 mmol/L    Anion Gap 13 7 - 19 mmol/L    Glucose 123 (H) 70 - 99 mg/dL    BUN 9 6 - 20 mg/dL    Creatinine 0.73 0.51 - 0.95 mg/dL    Glomerular Filtration Rate >90 >=60    BUN/Cr 12 7 - 25    Calcium 8.6 8.4 - 10.2 mg/dL    Bilirubin, Total 0.7 0.2 - 1.0 mg/dL    GOT/AST 24 <=37 Units/L    GPT/ALT 29 <64 Units/L    Alkaline Phosphatase 66 45 - 117 Units/L    Albumin 3.6 3.6 - 5.1 g/dL    Protein, Total 7.6 6.4 - 8.2 g/dL    Globulin 4.0 2.0 - 4.0 g/dL    A/G Ratio 0.9 (L) 1.0 - 2.4   Procalcitonin   Result Value Ref Range    Procalcitonin <0.05 <=0.09 ng/mL   Prothrombin Time   Result Value Ref Range    Prothrombin Time 10.7 9.7 - 11.8 sec    INR 1.1     Partial Thromboplastin Time   Result Value Ref Range    PTT 23 22 - 30 sec   Urinalysis & Reflex Microscopy With Culture If Indicated   Result Value Ref Range    COLOR, URINALYSIS Straw     APPEARANCE, URINALYSIS Clear     GLUCOSE, URINALYSIS Negative Negative mg/dL    BILIRUBIN, URINALYSIS Negative Negative    KETONES, URINALYSIS 20 (A) Negative mg/dL    SPECIFIC GRAVITY, URINALYSIS 1.010 1.005 - 1.030    OCCULT BLOOD, URINALYSIS Negative  Negative    PH, URINALYSIS 5.5 5.0 - 7.0    PROTEIN, URINALYSIS Negative Negative mg/dL    UROBILINOGEN, URINALYSIS 0.2 0.2, 1.0 mg/dL    NITRITE, URINALYSIS Negative Negative    LEUKOCYTE ESTERASE, URINALYSIS Negative Negative   Lipase   Result Value Ref Range    Lipase 72 (L) 73 - 393 Units/L   Magnesium   Result Value Ref Range    Magnesium 1.5 (L) 1.7 - 2.4 mg/dL   CBC with Automated Differential (performable only)   Result Value Ref Range    WBC 8.0 4.2 - 11.0 K/mcL    RBC 3.96 (L) 4.00 - 5.20 mil/mcL    HGB 12.2 12.0 - 15.5 g/dL    HCT 35.9 (L) 36.0 - 46.5 %    MCV 90.7 78.0 - 100.0 fl    MCH 30.8 26.0 - 34.0 pg    MCHC 34.0 32.0 - 36.5 g/dL    RDW-CV 12.4 11.0 - 15.0 %    RDW-SD 40.7 39.0 - 50.0 fL     140 - 450 K/mcL    NRBC 0 <=0 /100 WBC    Neutrophil, Percent 81 %    Lymphocytes, Percent 14 %    Mono, Percent 5 %    Eosinophils, Percent 0 %    Basophils, Percent 0 %    Immature Granulocytes 0 %    Absolute Neutrophils 6.4 1.8 - 7.7 K/mcL    Absolute Lymphocytes 1.1 1.0 - 4.8 K/mcL    Absolute Monocytes 0.4 0.3 - 0.9 K/mcL    Absolute Eosinophils  0.0 0.0 - 0.5 K/mcL    Absolute Basophils 0.0 0.0 - 0.3 K/mcL    Absolute Immature Granulocytes 0.0 0.0 - 0.2 K/mcL   COVID/Flu/RSV panel   Result Value Ref Range    Rapid SARS-COV-2 by PCR Not Detected Not Detected / Detected / Presumptive Positive / Inhibitors present    Influenza A by PCR Not Detected Not Detected    Influenza B by PCR Not Detected Not Detected    RSV BY PCR Not Detected Not Detected    Isolation Guidelines      Procedural Comment     ISTAT8 VENOUS  POINT OF CARE   Result Value Ref Range    BUN - POINT OF CARE 9 6 - 20 mg/dL    SODIUM - POINT OF CARE 139 135 - 145 mmol/L    POTASSIUM - POINT OF CARE 3.5 3.4 - 5.1 mmol/L    CHLORIDE - POINT OF CARE 104 97 - 110 mmol/L    TCO2 - POINT OF CARE 21 19 - 24 mmol/L    ANION GAP - POINT OF CARE 18 7 - 19 mmol/L    HEMATOCRIT - POINT OF CARE 38.0 36.0 - 46.5 %    HEMOGLOBIN - POINT OF CARE 12.9  12.0 - 15.5 g/dL    GLUCOSE - POINT OF CARE 121 (H) 70 - 99 mg/dL    CALCIUM, IONIZED - POINT OF CARE 1.17 1.15 - 1.29 mmol/L    Creatinine 0.60 0.51 - 0.95 mg/dL    Glomerular Filtration Rate >90 >=60   LACTIC ACID VENOUS WITH REFLEX - POINT OF CARE   Result Value Ref Range    LACTIC ACID, VENOUS - POINT OF CARE 0.9 <2.0 mmol/L   TROPONIN I  POINT OF CARE   Result Value Ref Range    TROPONIN I - POINT OF CARE <0.10 <0.10 ng/mL   HCG POC   Result Value Ref Range    HCG, URINE - POINT OF CARE Negative Negative       EKG     [facility] EKG report   Results for orders placed or performed during the hospital encounter of 07/05/23   ECG   Result Value Ref Range    Systolic Blood Pressure 120     Diastolic Blood Pressure 76     Ventricular Rate EKG/Min (BPM) 111     Atrial Rate (BPM) 111     AL-Interval (MSEC) 142     QRS-Interval (MSEC) 68     QT-Interval (MSEC) 308     QTc 418     P Axis (Degrees) 33     R Axis (Degrees) -12     T Axis (Degrees) 17     REPORT TEXT       Sinus tachycardia  Cannot rule out  Anterior infarct  , age undetermined  Abnormal ECG  No previous ECGs available  Confirmed by ASHLY LOMELI, SCOTT ([id number]),  Umer Irizarry ([id number]) on 7/5/2023 8:50:32 PM         Radiology Results     Imaging Results    None         ED Medications     Medications - No data to display      ED Course     Vitals:    07/05/23 2045 07/05/23 2100 07/05/23 2130 07/05/23 2210   BP: 120/76 124/65 122/79 120/83   BP Location:       Patient Position:       Pulse: (!) 105 (!) 105 97 91   Resp: 18 20 18 20   Temp:       TempSrc:       SpO2: 97% 98% 96% 95%   Weight:       Height:                Cardiac Monitor Review:  I have independently reviewed the patients cardiac monitor. The patient's rhythm shows sinus tachycardia  with rate of 112 bpm.        MDM                             Patient is a 47 year old with complaint of fever, chills, weakness, abdominal pain.  My differential diagnosis includes but is not limited to cystitis,  pyelonephritis, nephrolithiasis, cholecystitis, pancreatitis, diverticulitis.  Patient s/p appendectomy.  She arrives febrile and tachycardic.  Appears malaised in his warm to the touch with a fever of 102° F.  Has generalized abdominal tenderness worse in the epigastric and suprapubic area.  NSAIDs started for fever, hypomagnesemia supplemented.  Suspicion for intra-abdominal infection or cystitis/pyelonephritis.  Patient with 2 SIRS criteria.  Lactic acid level is not significantly elevated.  If source of infection identified she would have sepsis but not severe sepsis or septic shock.  Patient signed out to Dr. Cardoso pending UA and CT scan.    Critical Care       No Critical Care        Disposition     Patient care signed out to Dr. Cardoso at the end of my shift. Sign-out included relevant details of history, physical, and work-up to date. Will follow up on CT abdomen/pelvis, UA and final disposition.    ED Diagnoses     Diagnosis Comment Associated Orders       Final diagnoses    Abdominal pain, unspecified abdominal location -- --    SIRS (systemic inflammatory response syndrome) (CMD) -- --            have reviewed the information recorded by the scribe for accuracy and agree with its contents.    ____________________________________________________________________    Umer Irizarry acting as a scribe for Dr. Randall Crowder  Dictation # [id number]  Scribe: Randall Lovelace MD  07/05/23 2840

## 2023-09-25 NOTE — DISCHARGE NOTE OB - REASON FOR ADMISSION
Airway       Patient location during procedure: OR ([facility] - Operating Room or Procedural Area)  Staff -        Anesthesiologist:  Анна Flores MD       CRNA: Maryanne Liz APRN CRNA       Performed By: CRNA  Consent for Airway        Urgency: elective  Indications and Patient Condition       Indications for airway management: javed-procedural       Induction type:intravenous       Mask difficulty assessment: 1 - vent by mask    Final Airway Details       Final airway type: supraglottic airway    Supraglottic Airway Details        Type: LMA       Brand: Ambu AuraGain       LMA size: 5    Post intubation assessment        Number of attempts at approach: 1       Number of other approaches attempted: 0       Secured with: silk tape       Ease of procedure: easy       Dentition: Intact and Unchanged        
induction

## 2023-10-05 ENCOUNTER — NON-APPOINTMENT (OUTPATIENT)
Age: 33
End: 2023-10-05

## 2023-12-26 ENCOUNTER — NON-APPOINTMENT (OUTPATIENT)
Age: 33
End: 2023-12-26

## 2024-02-14 ENCOUNTER — NON-APPOINTMENT (OUTPATIENT)
Age: 34
End: 2024-02-14

## 2024-03-04 ENCOUNTER — NON-APPOINTMENT (OUTPATIENT)
Age: 34
End: 2024-03-04

## 2024-07-29 ENCOUNTER — OUTPATIENT (OUTPATIENT)
Dept: OUTPATIENT SERVICES | Facility: HOSPITAL | Age: 34
LOS: 1 days | End: 2024-07-29
Payer: COMMERCIAL

## 2024-07-29 DIAGNOSIS — N64.4 MASTODYNIA: ICD-10-CM

## 2024-07-29 PROCEDURE — G0279: CPT

## 2024-07-29 PROCEDURE — G0279: CPT | Mod: 26

## 2024-07-29 PROCEDURE — 77066 DX MAMMO INCL CAD BI: CPT

## 2024-07-29 PROCEDURE — 76641 ULTRASOUND BREAST COMPLETE: CPT | Mod: 26,50

## 2024-07-29 PROCEDURE — 77066 DX MAMMO INCL CAD BI: CPT | Mod: 26

## 2024-07-29 PROCEDURE — 76641 ULTRASOUND BREAST COMPLETE: CPT | Mod: 50

## 2024-07-30 DIAGNOSIS — N64.4 MASTODYNIA: ICD-10-CM

## 2024-07-31 ENCOUNTER — OUTPATIENT (OUTPATIENT)
Dept: OUTPATIENT SERVICES | Facility: HOSPITAL | Age: 34
LOS: 1 days | End: 2024-07-31
Payer: COMMERCIAL

## 2024-07-31 ENCOUNTER — APPOINTMENT (OUTPATIENT)
Age: 34
End: 2024-07-31
Payer: COMMERCIAL

## 2024-07-31 DIAGNOSIS — R92.8 OTHER ABNORMAL AND INCONCLUSIVE FINDINGS ON DIAGNOSTIC IMAGING OF BREAST: ICD-10-CM

## 2024-07-31 PROCEDURE — A4648: CPT

## 2024-07-31 PROCEDURE — 88305 TISSUE EXAM BY PATHOLOGIST: CPT | Mod: 26

## 2024-07-31 PROCEDURE — 19081 BX BREAST 1ST LESION STRTCTC: CPT | Mod: LT

## 2024-07-31 PROCEDURE — 88305 TISSUE EXAM BY PATHOLOGIST: CPT

## 2024-08-01 LAB — SURGICAL PATHOLOGY STUDY: SIGNIFICANT CHANGE UP

## 2024-08-05 ENCOUNTER — APPOINTMENT (OUTPATIENT)
Dept: BREAST CENTER | Facility: CLINIC | Age: 34
End: 2024-08-05

## 2024-08-05 PROBLEM — N64.4 BREAST PAIN IN FEMALE: Status: ACTIVE | Noted: 2024-08-05

## 2024-08-05 PROBLEM — D24.2 INTRADUCTAL PAPILLOMA OF BREAST, LEFT: Status: ACTIVE | Noted: 2024-08-05

## 2024-08-05 PROBLEM — R92.8 ABNORMAL FINDING ON BREAST IMAGING: Status: ACTIVE | Noted: 2024-08-05

## 2024-08-05 PROCEDURE — 99204 OFFICE O/P NEW MOD 45 MIN: CPT

## 2024-08-05 NOTE — ASSESSMENT
[FreeTextEntry1] : Hanna is 34 year old female here with new dx of left breast IDP  Her imaging is as follows: 7/29/2024 b/l dx mammo and US -->birads4 breasts are heterogeneously dense  upper inner quadrant of the left breast, corresponding to the patient's region of pain, there is persistent architectural distortion with associated calcifications. In the upper inner quadrant of the left breast, corresponding to the region of mammographic abnormality, there are multiple subcentimeter simple cysts.   07/31/2024 left stereotactic bx (top-hat)  - Benign breast tissue with cystic apocrine metaplasia, duct ectasia, usual ductal hyperplasia (UDH), and calcium oxalate crystals. - Small incidental intraductal papillomas.   On physical exam, there is a post biopsy masses in either breast or axilla.  There is no nipple discharge or inversion bilaterally.  There are no skin changes bilaterally.  Her pathology results were reviewed.  Intraductal papillomas without atypia are considered fibroproliferative lesions without atypia. there is no discrete mass or associated bloody discharge. It appears that the calcification is excised with biopsy. I offered the monitoring this and repeating L mammo in 6 months. She will follow up with our Advanced portion[facility] team. In the meantime, if there is any changes in the examination or palpable masses, she will follow up with us. I answered all her questions. She was in agreement with the plan. total time on encounter: 47 mins

## 2024-08-05 NOTE — PAST MEDICAL HISTORY
[Menarche Age ____] : age at menarche was [unfilled] [Total Preg ___] : G[unfilled] [Age At Live Birth ___] : Age at live birth: [unfilled]

## 2024-08-05 NOTE — DATA REVIEWED
[FreeTextEntry1] : C: [id number]     EXAM:  MG US BREAST COMPLETE BI   ORDERED BY: ESSIE BRYANT  ACC: [id number]     EXAM:  MG MAMMO DIAG W SANDRA BI#   ORDERED BY: ESSIE BRYANT  PROCEDURE DATE:  07/29/2024    INTERPRETATION:  CLINICAL HISTORY: Focal pain in the superior left breast.  The patient reports her last clinical breast examination was performed within the past year.  COMPARISON: None.  FAMILY HISTORY: No family history of breast cancer.  TECHNIQUE: Diagnostic mammography including tomosynthesis and diagnostic sonogram. Computer-aided detection was utilized by the radiologists in the interpretation of this examination.  BREAST COMPOSITION: The breasts are heterogeneously dense, which may obscure small masses.   FINDINGS:  MAMMOGRAM:  Within the upper inner quadrant of the left breast, corresponding to the patient's region of pain, there is persistent architectural distortion with associated calcifications.  No suspicious masses, microcalcifications or architectural distortion are seen in the right breast or remainder of the left breast.   ULTRASOUND: Complete bilateral breast ultrasound was performed.  In the upper inner quadrant of the left breast, corresponding to the region of mammographic abnormality, there are multiple subcentimeter simple cysts. No suspicious mass is seen in this region or remainder of the left breast.  No suspicious solid or cystic masses are identified in the right breast.  No axillary lymphadenopathy bilaterally.   IMPRESSION:   Sonographically occult architectural distortion with associated calcifications in the upper inner quadrant of the left breast, for which stereotactic guided biopsy is recommended.  No mammographic or sonographic evidence of malignancy in the right breast.  No evidence of axillary lymphadenopathy   Recommendation: Stereotactic guided biopsy.  BI-RADS Category 4: Suspicious C: [id number]     EXAM:  MG STEREO BX 1ST LT SISC   ORDERED BY: BROOKS RONDON  PROCEDURE DATE:  07/31/2024    INTERPRETATION:  CLINICAL INDICATION: Left breast architectural distortion.  Procedures: left breast stereotactic vacuum assisted core biopsy and post clip placement two view left breast mammogram.  Previous films and reports were reviewed. The procedure, its risks, benefits, and alternatives were explained to the patient, who expressed understanding and gave informed written and verbal consent. A time-out, which included the patient's full name, date of birth, description of the expected procedure and procedure site, was performed immediately before the procedure.  A medial approach was selected for the procedure. Using the usual sterile technique and stereotactic guidance, the previously described architectural distortion in the left breast upper inner quadrant were biopsied using a 9 gauge vacuum-assisted device.  A single pass was made and 12 samples were collected. A (UGAME top-hat) localizing clip was then placed into the biopsy cavity. Hemostasis was obtained and a sterile dressing was applied.  A unilateral left breast mammogram performed in the CC and lateral projections demonstrates biopsy marker in appropriate position.  The patient tolerated the procedure well and left the department in good condition. Written discharge instructions were discussed and provided to the patient.  IMPRESSION:  Successful stereotactic guided biopsy of the left breast.   Pathology: Pending, to be reported as an addendum.    7/31/2024 16:37 EDT Surgical Pathology Report - Auth (Verified) Specimen(s) Submitted Left breast architectual distortion Time obtained: 3:26 pm Time in formalin: 3:30 pm Final Diagnosis Breast, left architectural distortion; stereotactic guided vacuum assisted needle core biopsies: - Benign breast tissue with cystic apocrine metaplasia, duct ectasia, usual ductal hyperplasia (UDH), and calcium oxalate crystals. - Small incidental intraductal papillomas.

## 2024-08-05 NOTE — HISTORY OF PRESENT ILLNESS
[FreeTextEntry1] : Hanna is 34 year old female here with new dx of left breast IDP with usual ductal hyperplasia She states she has breast pain but denies any other breast related complaints    Her imaging is as follows: 2024 b/l dx mammo and US -->birads4 breasts are heterogeneously dense  upper inner quadrant of the left breast, corresponding to the patient's region of pain, there is persistent architectural distortion with associated calcifications. In the upper inner quadrant of the left breast, corresponding to the region of mammographic abnormality, there are multiple subcentimeter simple cysts.   2024 left stereotactic bx (top-hat)  - Benign breast tissue with cystic apocrine metaplasia, duct ectasia, usual ductal hyperplasia (UDH), and calcium oxalate crystals. - Small incidental intraductal papillomas.  HISTORICAL RISK FACTORS: -no fam hx of breast cancer or ovarian cancer  - first born at age 31 -no HRT

## 2024-08-05 NOTE — PHYSICAL EXAM
[___cm] : ~M [unfilled] ~Ucm upper inner quadrant mass [Breast Nipple Inversion] : nipples not inverted [Breast Nipple Retraction] : nipples not retracted [Breast Nipple Flattening] : nipples not flattened [Breast Nipple Fissures] : nipples not fissured [Breast Abnormal Lactation (Galactorrhea)] : no galactorrhea [Breast Abnormal Secretion Bloody Fluid] : no bloody discharge [Breast Abnormal Secretion Serous Fluid] : no serous discharge [Breast Abnormal Secretion Opalescent Fluid] : no milky discharge [No Axillary Lymphadenopathy] : no left axillary lymphadenopathy

## 2024-08-05 NOTE — DATA REVIEWED
[FreeTextEntry1] : C: [id number]     EXAM:  MG US BREAST COMPLETE BI   ORDERED BY: ESSIE BRYANT  ACC: [id number]     EXAM:  MG MAMMO DIAG W SANDRA BI#   ORDERED BY: ESSIE BRYANT  PROCEDURE DATE:  07/29/2024    INTERPRETATION:  CLINICAL HISTORY: Focal pain in the superior left breast.  The patient reports her last clinical breast examination was performed within the past year.  COMPARISON: None.  FAMILY HISTORY: No family history of breast cancer.  TECHNIQUE: Diagnostic mammography including tomosynthesis and diagnostic sonogram. Computer-aided detection was utilized by the radiologists in the interpretation of this examination.  BREAST COMPOSITION: The breasts are heterogeneously dense, which may obscure small masses.   FINDINGS:  MAMMOGRAM:  Within the upper inner quadrant of the left breast, corresponding to the patient's region of pain, there is persistent architectural distortion with associated calcifications.  No suspicious masses, microcalcifications or architectural distortion are seen in the right breast or remainder of the left breast.   ULTRASOUND: Complete bilateral breast ultrasound was performed.  In the upper inner quadrant of the left breast, corresponding to the region of mammographic abnormality, there are multiple subcentimeter simple cysts. No suspicious mass is seen in this region or remainder of the left breast.  No suspicious solid or cystic masses are identified in the right breast.  No axillary lymphadenopathy bilaterally.   IMPRESSION:   Sonographically occult architectural distortion with associated calcifications in the upper inner quadrant of the left breast, for which stereotactic guided biopsy is recommended.  No mammographic or sonographic evidence of malignancy in the right breast.  No evidence of axillary lymphadenopathy   Recommendation: Stereotactic guided biopsy.  BI-RADS Category 4: Suspicious C: [id number]     EXAM:  MG STEREO BX 1ST LT SISC   ORDERED BY: BROOKS RONDON  PROCEDURE DATE:  07/31/2024    INTERPRETATION:  CLINICAL INDICATION: Left breast architectural distortion.  Procedures: left breast stereotactic vacuum assisted core biopsy and post clip placement two view left breast mammogram.  Previous films and reports were reviewed. The procedure, its risks, benefits, and alternatives were explained to the patient, who expressed understanding and gave informed written and verbal consent. A time-out, which included the patient's full name, date of birth, description of the expected procedure and procedure site, was performed immediately before the procedure.  A medial approach was selected for the procedure. Using the usual sterile technique and stereotactic guidance, the previously described architectural distortion in the left breast upper inner quadrant were biopsied using a 9 gauge vacuum-assisted device.  A single pass was made and 12 samples were collected. A (Glycominds top-hat) localizing clip was then placed into the biopsy cavity. Hemostasis was obtained and a sterile dressing was applied.  A unilateral left breast mammogram performed in the CC and lateral projections demonstrates biopsy marker in appropriate position.  The patient tolerated the procedure well and left the department in good condition. Written discharge instructions were discussed and provided to the patient.  IMPRESSION:  Successful stereotactic guided biopsy of the left breast.   Pathology: Pending, to be reported as an addendum.    7/31/2024 16:37 EDT Surgical Pathology Report - Auth (Verified) Specimen(s) Submitted Left breast architectual distortion Time obtained: 3:26 pm Time in formalin: 3:30 pm Final Diagnosis Breast, left architectural distortion; stereotactic guided vacuum assisted needle core biopsies: - Benign breast tissue with cystic apocrine metaplasia, duct ectasia, usual ductal hyperplasia (UDH), and calcium oxalate crystals. - Small incidental intraductal papillomas.

## 2024-08-08 DIAGNOSIS — N60.42 MAMMARY DUCT ECTASIA OF LEFT BREAST: ICD-10-CM

## 2024-08-08 DIAGNOSIS — R92.8 OTHER ABNORMAL AND INCONCLUSIVE FINDINGS ON DIAGNOSTIC IMAGING OF BREAST: ICD-10-CM

## 2024-08-08 DIAGNOSIS — D24.2 BENIGN NEOPLASM OF LEFT BREAST: ICD-10-CM

## 2024-08-08 DIAGNOSIS — N60.82 OTHER BENIGN MAMMARY DYSPLASIAS OF LEFT BREAST: ICD-10-CM

## 2024-08-17 ENCOUNTER — NON-APPOINTMENT (OUTPATIENT)
Age: 34
End: 2024-08-17

## 2024-08-19 ENCOUNTER — APPOINTMENT (OUTPATIENT)
Dept: SURGERY | Facility: CLINIC | Age: 34
End: 2024-08-19

## 2024-11-22 ENCOUNTER — NON-APPOINTMENT (OUTPATIENT)
Age: 34
End: 2024-11-22

## 2025-02-11 ENCOUNTER — APPOINTMENT (OUTPATIENT)
Dept: BREAST CENTER | Facility: CLINIC | Age: 35
End: 2025-02-11

## 2025-03-10 ENCOUNTER — NON-APPOINTMENT (OUTPATIENT)
Age: 35
End: 2025-03-10

## 2025-06-01 ENCOUNTER — NON-APPOINTMENT (OUTPATIENT)
Age: 35
End: 2025-06-01

## 2025-09-12 ENCOUNTER — APPOINTMENT (OUTPATIENT)
Dept: OBGYN | Facility: CLINIC | Age: 35
End: 2025-09-12